# Patient Record
Sex: FEMALE | Race: WHITE | URBAN - METROPOLITAN AREA
[De-identification: names, ages, dates, MRNs, and addresses within clinical notes are randomized per-mention and may not be internally consistent; named-entity substitution may affect disease eponyms.]

---

## 2023-09-14 ENCOUNTER — HOSPITAL ENCOUNTER (OUTPATIENT)
Dept: DATA CONVERSION | Facility: HOSPITAL | Age: 43
Discharge: HOME | End: 2023-09-14
Payer: COMMERCIAL

## 2023-09-14 DIAGNOSIS — Z00.00 ENCOUNTER FOR GENERAL ADULT MEDICAL EXAMINATION WITHOUT ABNORMAL FINDINGS: ICD-10-CM

## 2023-09-14 DIAGNOSIS — E53.8 DEFICIENCY OF OTHER SPECIFIED B GROUP VITAMINS: ICD-10-CM

## 2023-09-14 DIAGNOSIS — Z13.6 ENCOUNTER FOR SCREENING FOR CARDIOVASCULAR DISORDERS: ICD-10-CM

## 2023-09-14 DIAGNOSIS — R53.83 OTHER FATIGUE: ICD-10-CM

## 2023-09-14 DIAGNOSIS — R10.9 UNSPECIFIED ABDOMINAL PAIN: ICD-10-CM

## 2023-09-14 DIAGNOSIS — E55.9 VITAMIN D DEFICIENCY, UNSPECIFIED: ICD-10-CM

## 2023-09-14 LAB
25(OH)D3 SERPL-MCNC: 61 NG/ML (ref 31–100)
ALBUMIN SERPL-MCNC: 3.9 GM/DL (ref 3.5–5)
ALBUMIN/GLOB SERPL: 1.1 RATIO (ref 1.5–3)
ALP BLD-CCNC: 113 U/L (ref 35–125)
ALT SERPL-CCNC: 31 U/L (ref 5–40)
AMYLASE SERPL-CCNC: 29 U/L (ref 28–100)
ANION GAP SERPL CALCULATED.3IONS-SCNC: 12 MMOL/L (ref 0–19)
APPEARANCE PLAS: CLEAR
AST SERPL-CCNC: 31 U/L (ref 5–40)
BACTERIA SPEC CULT: NORMAL
BACTERIA UR QL AUTO: PRESENT
BASOPHILS # BLD AUTO: 0.14 K/UL (ref 0–0.22)
BASOPHILS NFR BLD AUTO: 2.5 % (ref 0–1)
BILIRUB SERPL-MCNC: 0.2 MG/DL (ref 0.1–1.2)
BILIRUB UR QL STRIP.AUTO: NEGATIVE
BUN SERPL-MCNC: 8 MG/DL (ref 8–25)
BUN/CREAT SERPL: 8.9 RATIO (ref 8–21)
CALCIUM SERPL-MCNC: 9.2 MG/DL (ref 8.5–10.4)
CHLORIDE SERPL-SCNC: 105 MMOL/L (ref 97–107)
CHOLEST SERPL-MCNC: 188 MG/DL (ref 133–200)
CHOLEST/HDLC SERPL: 3.4 RATIO
CLARITY UR: ABNORMAL
CO2 SERPL-SCNC: 23 MMOL/L (ref 24–31)
COLOR SPUN FLD: YELLOW
COLOR UR: YELLOW
CREAT SERPL-MCNC: 0.9 MG/DL (ref 0.4–1.6)
DEPRECATED RDW RBC AUTO: 42.6 FL (ref 37–54)
DIFFERENTIAL METHOD BLD: ABNORMAL
EOSINOPHIL # BLD AUTO: 0.13 K/UL (ref 0–0.45)
EOSINOPHIL NFR BLD: 2.3 % (ref 0–3)
EPITH CASTS #/AREA UR COMP ASSIST: ABNORMAL /HPF
ERYTHROCYTE [DISTWIDTH] IN BLOOD BY AUTOMATED COUNT: 14.8 % (ref 11.7–15)
ESTRADIOL SERPL-MCNC: NORMAL PG/ML
FASTING STATUS PATIENT QL REPORTED: NORMAL
FSH SERPL-ACNC: 7.4 MU/ML
GFR SERPL CREATININE-BSD FRML MDRD: 82 ML/MIN/1.73 M2
GLOBULIN SER-MCNC: 3.6 G/DL (ref 1.9–3.7)
GLUCOSE SERPL-MCNC: 88 MG/DL (ref 65–99)
GLUCOSE UR STRIP.AUTO-MCNC: NEGATIVE MG/DL
HCT VFR BLD AUTO: 38.8 % (ref 36–44)
HDLC SERPL-MCNC: 56 MG/DL
HGB BLD-MCNC: 12.5 GM/DL (ref 12–15)
HGB UR QL STRIP.AUTO: ABNORMAL /HPF (ref 0–3)
HGB UR QL: NEGATIVE
IMM GRANULOCYTES # BLD AUTO: 0.02 K/UL (ref 0–0.1)
KETONES UR QL STRIP.AUTO: NEGATIVE
LDLC SERPL CALC-MCNC: 106 MG/DL (ref 65–130)
LEUKOCYTE ESTERASE UR QL STRIP.AUTO: ABNORMAL
LH SERPL-ACNC: 10.1 MU/ML
LIPASE SERPL-CCNC: 25 U/L (ref 16–63)
LYMPHOCYTES # BLD AUTO: 1.4 K/UL (ref 1.2–3.2)
LYMPHOCYTES NFR BLD MANUAL: 25 % (ref 20–40)
MCH RBC QN AUTO: 25.6 PG (ref 26–34)
MCHC RBC AUTO-ENTMCNC: 32.2 % (ref 31–37)
MCV RBC AUTO: 79.5 FL (ref 80–100)
MICRO URNS: ABNORMAL
MICROSCOPIC (UA): ABNORMAL
MONOCYTES # BLD AUTO: 0.66 K/UL (ref 0–0.8)
MONOCYTES NFR BLD MANUAL: 11.8 % (ref 0–8)
NEUTROPHILS # BLD AUTO: 3.25 K/UL
NEUTROPHILS # BLD AUTO: 3.25 K/UL (ref 1.8–7.7)
NEUTROPHILS.IMMATURE NFR BLD: 0.4 % (ref 0–1)
NEUTS SEG NFR BLD: 58 % (ref 50–70)
NITRITE UR QL STRIP.AUTO: NEGATIVE
NRBC BLD-RTO: 0 /100 WBC
PH UR STRIP.AUTO: 8 [PH] (ref 4.6–8)
PLATELET # BLD AUTO: 376 K/UL (ref 150–450)
PMV BLD AUTO: 11.1 CU (ref 7–12.6)
POTASSIUM SERPL-SCNC: 4 MMOL/L (ref 3.4–5.1)
PROGEST SERPL-MCNC: NORMAL NG/ML
PROLACTIN SERPL-MCNC: 8.7 NG/ML (ref 4.8–23.3)
PROT SERPL-MCNC: 7.5 G/DL (ref 5.9–7.9)
PROT UR STRIP.AUTO-MCNC: 30 MG/DL
RBC # BLD AUTO: 4.88 M/UL (ref 4–4.9)
REPORT STATUS -LH SQ DATA CONVERSION: NORMAL
SERVICE CMNT-IMP: NORMAL
SODIUM SERPL-SCNC: 140 MMOL/L (ref 133–145)
SP GR UR STRIP.AUTO: 1.02 (ref 1–1.03)
SPECIMEN SOURCE: NORMAL
TRIGL SERPL-MCNC: 129 MG/DL (ref 40–150)
TSH SERPL DL<=0.05 MIU/L-ACNC: 2.16 MIU/L (ref 0.27–4.2)
URINE CULTURE: ABNORMAL
UROBILINOGEN UR QL STRIP.AUTO: NORMAL MG/DL (ref 0–1)
VIT B12 SERPL-MCNC: 1371 PG/ML (ref 211–946)
WBC # BLD AUTO: 5.6 K/UL (ref 4.5–11)
WBC #/AREA URNS AUTO: ABNORMAL /HPF (ref 0–3)

## 2023-10-19 ENCOUNTER — ANCILLARY PROCEDURE (OUTPATIENT)
Dept: RADIOLOGY | Facility: CLINIC | Age: 43
End: 2023-10-19
Payer: COMMERCIAL

## 2023-10-19 DIAGNOSIS — C85.89 OTHER SPECIFIED TYPES OF NON-HODGKIN LYMPHOMA, EXTRANODAL AND SOLID ORGAN SITES (MULTI): Primary | ICD-10-CM

## 2023-10-19 PROCEDURE — 3430000001 HC RX 343 DIAGNOSTIC RADIOPHARMACEUTICALS: Performed by: INTERNAL MEDICINE

## 2023-10-19 PROCEDURE — 78815 PET IMAGE W/CT SKULL-THIGH: CPT | Mod: MG

## 2023-10-19 PROCEDURE — A9552 F18 FDG: HCPCS | Performed by: INTERNAL MEDICINE

## 2023-10-19 RX ORDER — FLUDEOXYGLUCOSE F 18 200 MCI/ML
11.9 INJECTION, SOLUTION INTRAVENOUS
Status: COMPLETED | OUTPATIENT
Start: 2023-10-19 | End: 2023-10-19

## 2023-10-19 RX ADMIN — FLUDEOXYGLUCOSE F 18 11.9 MILLICURIE: 200 INJECTION, SOLUTION INTRAVENOUS at 13:38

## 2023-11-01 ENCOUNTER — TELEPHONE (OUTPATIENT)
Dept: HEMATOLOGY/ONCOLOGY | Facility: CLINIC | Age: 43
End: 2023-11-01
Payer: COMMERCIAL

## 2023-11-01 NOTE — TELEPHONE ENCOUNTER
IMPRESSION:  No abnormal hypermetabolic foci to suggest lymphoma or other  malignancy.    Read this to Maciej who was very pleased.  No further needs.

## 2023-11-17 DIAGNOSIS — Z12.31 ENCOUNTER FOR SCREENING MAMMOGRAM FOR MALIGNANT NEOPLASM OF BREAST: ICD-10-CM

## 2023-11-17 DIAGNOSIS — K21.9 GASTROESOPHAGEAL REFLUX DISEASE WITHOUT ESOPHAGITIS: Primary | ICD-10-CM

## 2023-11-17 RX ORDER — OMEPRAZOLE 40 MG/1
40 CAPSULE, DELAYED RELEASE ORAL
Qty: 90 CAPSULE | Refills: 3 | Status: SHIPPED | OUTPATIENT
Start: 2023-11-17 | End: 2024-11-16

## 2023-12-11 NOTE — PROGRESS NOTES
Patient ID: Maciej Noyola is a 43 y.o. female.    Subjective    HPI  43-year-old woman with underlying depression and morbid obesity who was noted by her partner to have a lump in the lumbar spine region in early 2019. She thought  initially that this was a muscle issue, thus she saw a chiropractor. This worsened, thus she underwent further evaluation. The initial ultrasound on February 6, 2019 showed a 4 x 2.5 cm lesion. Subsequent MRI spine showed a lobulated appearing cystic  focus measuring up to 3.6 cm. No spinal cord/vertebral body involvement. On February 28, patient underwent an excision of the lumbar mass. 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 Flow cytometry showed a monoclonal B-cell  population without CD5, CD10 or CD11 expression, consistent with B-cell lymphoma. 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 Patient underwent a PET/CT on 03/25/2019, which showed diffuse hypermetabolic marrow  activity involvingvertebral bodies throughout the spine and pelvic bone. There was moderate hypermetabolic activity involving thespleen with maximum SUV of 5.3. This was all thought to be due to acute mononucleosis. The patient underwent a repeat PET/CT  in June 2019, which was completely negative. She then subsequentlyunderwent radiation therapy by Dr. Crowe to the soft tissue of the back. She received 26 Gy total, completing onAugust 16, 2019. She has since remained on observation. The patient complained  of right breast pruritus in April 2020. MRI breast was ordered, which showed a 1.3cmoval mass at the 3 o'clock position. Biopsy on May 8, 2020 revealed a low-grade marginal B cell lymphoma. She thenunderwent a PET/CT on June 17, 2020. This showed moderate  activity with an SUV of 2.4 corresponding to an ill-definedmass 2.6 cm in the posterior lateral aspect at the 9 o'clock axis of the right breast. There was also modest activity inthe  uterus, although she was menstruating. There was asymmetric hypermetabolic  bone marrow activity as well, including in the thoracic and lumbar spine region, questioning low-grade lymphoma. She received four doses of rituximab in 2020.   She is going through premenopause.  Some hot flashes.  No fevers, no weight loss.    No bone aches or pains   8, PET/CT showed stable disease from the prior, with persistent lowactivity in the thoracic and lumbar spine region and in that single breast region.      Interval follow-up: No B symptoms of fever, drenching night sweats, weight loss.  No new skin lesions.  No breast lesions.     FAMILY HISTORY: Maternal aunt breast cancer in her 70s, maternal uncle with pancreatic cancer in his 50s.   mother  of leukemia, , age 74     SOCIAL HISTORY: No smoking. No alcohol.      PAST SURGICAL HISTORY: Left back excisional biopsy, right knee surgery, dental extraction     Objective    BSA: There is no height or weight on file to calculate BSA.  There were no vitals taken for this visit.   Review of Systems   Constitutional:  Positive for fatigue. Negative for diaphoresis, fever and unexpected weight change.   HENT:  Negative for trouble swallowing.    Eyes:  Negative for visual disturbance.   Respiratory:  Negative for shortness of breath.    Cardiovascular:  Negative for leg swelling.   Gastrointestinal:  Negative for blood in stool and nausea.   Endocrine: Negative for cold intolerance.   Genitourinary:  Negative for dysuria.   Musculoskeletal:  Negative for back pain.   Skin:  Negative for rash.   Allergic/Immunologic: Negative for environmental allergies.   Neurological:  Positive for headaches. Negative for light-headedness.   Hematological:  Negative for adenopathy.   Psychiatric/Behavioral:  Negative for sleep disturbance.        Physical Exam  Constitutional:       Appearance: She is obese.   HENT:      Head: Normocephalic and atraumatic.      Right Ear:  External ear normal.      Left Ear: External ear normal.      Mouth/Throat:      Mouth: Mucous membranes are moist.      Pharynx: Oropharynx is clear.   Eyes:      Extraocular Movements: Extraocular movements intact.      Pupils: Pupils are equal, round, and reactive to light.   Cardiovascular:      Rate and Rhythm: Normal rate and regular rhythm.   Pulmonary:      Effort: Pulmonary effort is normal.      Breath sounds: Normal breath sounds.   Abdominal:      General: Abdomen is flat.      Palpations: Abdomen is soft.      Tenderness: There is abdominal tenderness (LUQ tenderness. no mass palpable).   Musculoskeletal:         General: Normal range of motion.      Cervical back: Normal range of motion and neck supple.   Skin:     General: Skin is warm.   Neurological:      General: No focal deficit present.      Mental Status: She is alert and oriented to person, place, and time.   Psychiatric:         Mood and Affect: Mood normal.         Behavior: Behavior normal.       Performance Status:        Assessment/Plan   Summary: 43-year-old woman with underlying depression versus ADD, obesity and marginal zone lymphoma status post radiation therapy.        Marginal zone lymphoma, cutaneous[back]  She presented in 2019 with an enlarging lower back lesion. She ultimately underwent excision of this lesion in February 2019, which showed a low grade B-cell lymphoma favoring a daron marginal zone lymphoma. PET/CT in March 2019 showed diffuse activity  throughout the spinous processes and in the spleen which was thought related to an acute infectious mononucleosis.   Repeat PET/CT in June 2019 was negative. She underwent radiation therapy to the back region, completing in August 2019.   She underwent MRI breast imaging for abnormal breast pruritus in April 2020, which revealed a1.3cm mass in the region. Biopsy confirmed recurrent marginal zone lymphoma.   PET/CT in June 2020 showeduptake in that breast area, questionably in the  uterine region and in the bones. Ultrasound of the pelvic region was negative.   She underwent a four week course of Rituximab.   Repeat PET/CT by November 2020 and breast imagingwas stable.   1. Recurrent marginal zone B-cell lymphoma. We will continue on observation. We will plan to repeat a PET/CTin June 2021. The rationale was that the CT imaging was unable to detect disease progression previously, especially in the spine region. She is  otherwise on observation alone. If she progresses in the future, we canalways consider lenalidomide with rituximab.  PET scan, 10/19/2023, no abnormal hypermetabolic foci to suggest lymphoma  diffuse heterogenous uptake within the bone marrow. This  finding can be seen with activated bone marrow, which is often seen  in a patient on chemotherapy. There is no evidence of focal  hypermetabolic osseous lesions to suggest metastasis.  Plan:    - continue with visits every 6 months   - labs every visit: CBC, Comp, LDH   - SPEP  - continue with yearly or twice yearly mammogram in January  PET scan in November 20204       Saw Vik Rojo in January      2. Mild leukopenia, resolved.      3. Melena. No recurrence  She did have a GI colonoscopy which was unremarkable in the fall of 2020.      4. Migraines. Headaches back of head.  Recommend hands and knee     5. Depression versus ADD. She will continue Qelbree® (viloxazine)      6. Abnormal scalp lesions.   These have resolved.  No recurrence since switching to Dove shampoo.      7. Abnormal breast lesions. Repeat mammography was normal as of November 2020.  Repeat mammogram in January 2024                  Lopez Hawkins MD

## 2023-12-12 ENCOUNTER — OFFICE VISIT (OUTPATIENT)
Dept: HEMATOLOGY/ONCOLOGY | Facility: CLINIC | Age: 43
End: 2023-12-12
Payer: COMMERCIAL

## 2023-12-12 VITALS
HEART RATE: 75 BPM | OXYGEN SATURATION: 99 % | TEMPERATURE: 96.1 F | DIASTOLIC BLOOD PRESSURE: 86 MMHG | WEIGHT: 223.66 LBS | HEIGHT: 63 IN | BODY MASS INDEX: 39.63 KG/M2 | SYSTOLIC BLOOD PRESSURE: 127 MMHG | RESPIRATION RATE: 18 BRPM

## 2023-12-12 DIAGNOSIS — D50.0 IRON DEFICIENCY ANEMIA DUE TO CHRONIC BLOOD LOSS: Primary | ICD-10-CM

## 2023-12-12 PROBLEM — F98.8 ATTENTION DEFICIT DISORDER: Status: ACTIVE | Noted: 2023-12-12

## 2023-12-12 PROBLEM — M54.2 CERVICALGIA: Status: ACTIVE | Noted: 2023-12-12

## 2023-12-12 PROBLEM — M75.40 ROTATOR CUFF IMPINGEMENT SYNDROME: Status: ACTIVE | Noted: 2023-12-12

## 2023-12-12 PROBLEM — F32.9 MAJOR DEPRESSIVE DISORDER, SINGLE EPISODE, UNSPECIFIED: Status: ACTIVE | Noted: 2023-12-12

## 2023-12-12 PROBLEM — C85.10 LOW GRADE B-CELL LYMPHOMA (MULTI): Status: ACTIVE | Noted: 2023-12-12

## 2023-12-12 PROBLEM — R07.9 CHEST PAIN: Status: ACTIVE | Noted: 2023-12-12

## 2023-12-12 PROBLEM — M25.539 PAIN IN WRIST: Status: ACTIVE | Noted: 2023-12-12

## 2023-12-12 PROBLEM — E66.9 OBESITY (BMI 30-39.9): Status: ACTIVE | Noted: 2023-12-12

## 2023-12-12 PROBLEM — M54.30 SCIATICA: Status: ACTIVE | Noted: 2023-12-12

## 2023-12-12 PROBLEM — G47.19 EXCESSIVE DAYTIME SLEEPINESS: Status: ACTIVE | Noted: 2023-12-12

## 2023-12-12 PROBLEM — M79.641 PAIN IN RIGHT HAND: Status: ACTIVE | Noted: 2023-12-12

## 2023-12-12 PROBLEM — E55.9 VITAMIN D DEFICIENCY: Status: ACTIVE | Noted: 2023-12-12

## 2023-12-12 PROBLEM — E53.8 VITAMIN B12 DEFICIENCY: Status: ACTIVE | Noted: 2023-12-12

## 2023-12-12 PROBLEM — R41.840 ATTENTION AND CONCENTRATION DEFICIT: Status: ACTIVE | Noted: 2023-12-12

## 2023-12-12 PROBLEM — G47.19 OTHER HYPERSOMNIA: Status: ACTIVE | Noted: 2023-12-12

## 2023-12-12 PROBLEM — S42.293A CLOSED FRACTURE OF HEAD OF HUMERUS: Status: ACTIVE | Noted: 2023-12-12

## 2023-12-12 PROBLEM — M25.511 ACUTE PAIN OF RIGHT SHOULDER: Status: ACTIVE | Noted: 2023-12-12

## 2023-12-12 PROBLEM — M75.81 TENDINITIS OF RIGHT ROTATOR CUFF: Status: ACTIVE | Noted: 2023-12-12

## 2023-12-12 PROBLEM — R10.9 ABDOMINAL PAIN: Status: ACTIVE | Noted: 2023-12-12

## 2023-12-12 PROBLEM — S46.011A STRAIN OF RIGHT ROTATOR CUFF CAPSULE: Status: ACTIVE | Noted: 2023-12-12

## 2023-12-12 PROBLEM — G47.33 OSA (OBSTRUCTIVE SLEEP APNEA): Status: ACTIVE | Noted: 2023-12-12

## 2023-12-12 PROBLEM — M75.01 ADHESIVE CAPSULITIS OF RIGHT SHOULDER: Status: ACTIVE | Noted: 2023-12-12

## 2023-12-12 PROBLEM — R25.2 SPASM: Status: ACTIVE | Noted: 2023-12-12

## 2023-12-12 PROBLEM — F41.9 ANXIETY: Status: ACTIVE | Noted: 2023-12-12

## 2023-12-12 PROBLEM — M95.8 WINGED SCAPULA OF BOTH SIDES: Status: ACTIVE | Noted: 2023-12-12

## 2023-12-12 PROBLEM — M75.111 INCOMPLETE TEAR OF RIGHT ROTATOR CUFF: Status: ACTIVE | Noted: 2023-12-12

## 2023-12-12 LAB
ALBUMIN SERPL BCP-MCNC: 4.1 G/DL (ref 3.4–5)
ALP SERPL-CCNC: 98 U/L (ref 33–110)
ALT SERPL W P-5'-P-CCNC: 14 U/L (ref 7–45)
ANION GAP SERPL CALC-SCNC: 15 MMOL/L (ref 10–20)
AST SERPL W P-5'-P-CCNC: 16 U/L (ref 9–39)
BASOPHILS # BLD AUTO: 0.13 X10*3/UL (ref 0–0.1)
BASOPHILS NFR BLD AUTO: 2 %
BILIRUB SERPL-MCNC: 0.5 MG/DL (ref 0–1.2)
BUN SERPL-MCNC: 9 MG/DL (ref 6–23)
CALCIUM SERPL-MCNC: 8.6 MG/DL (ref 8.6–10.3)
CHLORIDE SERPL-SCNC: 104 MMOL/L (ref 98–107)
CO2 SERPL-SCNC: 25 MMOL/L (ref 21–32)
CREAT SERPL-MCNC: 0.86 MG/DL (ref 0.5–1.05)
EOSINOPHIL # BLD AUTO: 0.17 X10*3/UL (ref 0–0.7)
EOSINOPHIL NFR BLD AUTO: 2.6 %
ERYTHROCYTE [DISTWIDTH] IN BLOOD BY AUTOMATED COUNT: 14.7 % (ref 11.5–14.5)
FERRITIN SERPL-MCNC: 40 NG/ML (ref 8–150)
GFR SERPL CREATININE-BSD FRML MDRD: 87 ML/MIN/1.73M*2
GLUCOSE SERPL-MCNC: 86 MG/DL (ref 74–99)
HCT VFR BLD AUTO: 39.2 % (ref 36–46)
HGB BLD-MCNC: 12.4 G/DL (ref 12–16)
IMM GRANULOCYTES # BLD AUTO: 0.03 X10*3/UL (ref 0–0.7)
IMM GRANULOCYTES NFR BLD AUTO: 0.5 % (ref 0–0.9)
IRON SATN MFR SERPL: 15 % (ref 25–45)
IRON SERPL-MCNC: 56 UG/DL (ref 35–150)
LDH SERPL L TO P-CCNC: 132 U/L (ref 84–246)
LYMPHOCYTES # BLD AUTO: 1.39 X10*3/UL (ref 1.2–4.8)
LYMPHOCYTES NFR BLD AUTO: 21.3 %
MCH RBC QN AUTO: 25.3 PG (ref 26–34)
MCHC RBC AUTO-ENTMCNC: 31.6 G/DL (ref 32–36)
MCV RBC AUTO: 80 FL (ref 80–100)
MONOCYTES # BLD AUTO: 0.58 X10*3/UL (ref 0.1–1)
MONOCYTES NFR BLD AUTO: 8.9 %
NEUTROPHILS # BLD AUTO: 4.22 X10*3/UL (ref 1.2–7.7)
NEUTROPHILS NFR BLD AUTO: 64.7 %
NRBC BLD-RTO: 0 /100 WBCS (ref 0–0)
PLATELET # BLD AUTO: 401 X10*3/UL (ref 150–450)
POTASSIUM SERPL-SCNC: 3.5 MMOL/L (ref 3.5–5.3)
PROT SERPL-MCNC: 7.1 G/DL (ref 6.4–8.2)
RBC # BLD AUTO: 4.91 X10*6/UL (ref 4–5.2)
SODIUM SERPL-SCNC: 140 MMOL/L (ref 136–145)
TIBC SERPL-MCNC: 375 UG/DL (ref 240–445)
UIBC SERPL-MCNC: 319 UG/DL (ref 110–370)
WBC # BLD AUTO: 6.5 X10*3/UL (ref 4.4–11.3)

## 2023-12-12 PROCEDURE — 99214 OFFICE O/P EST MOD 30 MIN: CPT | Performed by: INTERNAL MEDICINE

## 2023-12-12 PROCEDURE — 83550 IRON BINDING TEST: CPT | Performed by: INTERNAL MEDICINE

## 2023-12-12 PROCEDURE — 36415 COLL VENOUS BLD VENIPUNCTURE: CPT | Performed by: INTERNAL MEDICINE

## 2023-12-12 PROCEDURE — 82728 ASSAY OF FERRITIN: CPT | Performed by: INTERNAL MEDICINE

## 2023-12-12 PROCEDURE — 80053 COMPREHEN METABOLIC PANEL: CPT | Performed by: INTERNAL MEDICINE

## 2023-12-12 PROCEDURE — 83615 LACTATE (LD) (LDH) ENZYME: CPT | Performed by: INTERNAL MEDICINE

## 2023-12-12 PROCEDURE — 85025 COMPLETE CBC W/AUTO DIFF WBC: CPT | Performed by: INTERNAL MEDICINE

## 2023-12-12 RX ORDER — LISDEXAMFETAMINE DIMESYLATE 30 MG/1
30 CAPSULE ORAL EVERY MORNING
COMMUNITY
Start: 2023-05-02 | End: 2024-03-06 | Stop reason: ALTCHOICE

## 2023-12-12 RX ORDER — UBIDECARENONE 75 MG
500 CAPSULE ORAL DAILY
COMMUNITY

## 2023-12-12 RX ORDER — SUCRALFATE 1 G/1
1 TABLET ORAL
COMMUNITY
Start: 2023-05-22 | End: 2024-03-06 | Stop reason: ALTCHOICE

## 2023-12-12 RX ORDER — CYCLOBENZAPRINE HCL 5 MG
5 TABLET ORAL EVERY 12 HOURS PRN
COMMUNITY
Start: 2021-10-29 | End: 2024-03-06 | Stop reason: ALTCHOICE

## 2023-12-12 RX ORDER — VILOXAZINE HYDROCHLORIDE 100 MG/1
2 CAPSULE, EXTENDED RELEASE ORAL DAILY
COMMUNITY

## 2023-12-12 RX ORDER — CLONIDINE HYDROCHLORIDE 0.1 MG/1
0.1 TABLET ORAL NIGHTLY
COMMUNITY
Start: 2023-06-13 | End: 2024-03-06 | Stop reason: ALTCHOICE

## 2023-12-12 RX ORDER — DEXLANSOPRAZOLE 60 MG/1
1 CAPSULE, DELAYED RELEASE ORAL DAILY
COMMUNITY
Start: 2023-05-22 | End: 2024-03-06 | Stop reason: ALTCHOICE

## 2023-12-12 RX ORDER — VILAZODONE HYDROCHLORIDE 20 MG/1
20 TABLET ORAL
COMMUNITY
Start: 2017-11-06 | End: 2024-03-06 | Stop reason: ALTCHOICE

## 2023-12-12 RX ORDER — VILAZODONE HYDROCHLORIDE 10 MG/1
10 TABLET ORAL
COMMUNITY
Start: 2021-06-28 | End: 2024-03-06 | Stop reason: ALTCHOICE

## 2023-12-12 RX ORDER — ERGOCALCIFEROL 1.25 MG/1
1 CAPSULE ORAL
COMMUNITY
Start: 2021-08-27 | End: 2024-06-03

## 2023-12-12 ASSESSMENT — ENCOUNTER SYMPTOMS
LOSS OF SENSATION IN FEET: 0
BACK PAIN: 0
NAUSEA: 0
FATIGUE: 1
LIGHT-HEADEDNESS: 0
HEADACHES: 1
DEPRESSION: 0
TROUBLE SWALLOWING: 0
UNEXPECTED WEIGHT CHANGE: 0
DIAPHORESIS: 0
FEVER: 0
DYSURIA: 0
SLEEP DISTURBANCE: 0
BLOOD IN STOOL: 0
OCCASIONAL FEELINGS OF UNSTEADINESS: 0
ADENOPATHY: 0
SHORTNESS OF BREATH: 0

## 2023-12-12 ASSESSMENT — COLUMBIA-SUICIDE SEVERITY RATING SCALE - C-SSRS
1. IN THE PAST MONTH, HAVE YOU WISHED YOU WERE DEAD OR WISHED YOU COULD GO TO SLEEP AND NOT WAKE UP?: NO
2. HAVE YOU ACTUALLY HAD ANY THOUGHTS OF KILLING YOURSELF?: NO
6. HAVE YOU EVER DONE ANYTHING, STARTED TO DO ANYTHING, OR PREPARED TO DO ANYTHING TO END YOUR LIFE?: NO

## 2023-12-12 ASSESSMENT — PATIENT HEALTH QUESTIONNAIRE - PHQ9
2. FEELING DOWN, DEPRESSED OR HOPELESS: NOT AT ALL
1. LITTLE INTEREST OR PLEASURE IN DOING THINGS: NOT AT ALL
SUM OF ALL RESPONSES TO PHQ9 QUESTIONS 1 AND 2: 0

## 2023-12-12 ASSESSMENT — PAIN SCALES - GENERAL: PAINLEVEL: 0-NO PAIN

## 2023-12-12 NOTE — PROGRESS NOTES
Patient here alone for follow up with Dr. Hawkins for review of scan.    Reconciled and reviewed medication and allergy list with patient.     Labs drawn today, prior to MD visit, per MD orders.    Patient states she is having no issues/concerns at this time.    Per MD, patient to return in 6 months.    No barriers to education noted, pt. Agreed to plan and verbalized understanding using teach back method

## 2024-01-02 ENCOUNTER — ANCILLARY PROCEDURE (OUTPATIENT)
Dept: RADIOLOGY | Facility: CLINIC | Age: 44
End: 2024-01-02
Payer: COMMERCIAL

## 2024-01-02 VITALS — HEIGHT: 64 IN | BODY MASS INDEX: 38.41 KG/M2 | WEIGHT: 225 LBS

## 2024-01-02 DIAGNOSIS — Z12.31 ENCOUNTER FOR SCREENING MAMMOGRAM FOR MALIGNANT NEOPLASM OF BREAST: ICD-10-CM

## 2024-01-02 PROCEDURE — 77067 SCR MAMMO BI INCL CAD: CPT

## 2024-02-02 ENCOUNTER — TELEPHONE (OUTPATIENT)
Dept: HEMATOLOGY/ONCOLOGY | Facility: CLINIC | Age: 44
End: 2024-02-02
Payer: COMMERCIAL

## 2024-02-06 ENCOUNTER — OFFICE VISIT (OUTPATIENT)
Dept: HEMATOLOGY/ONCOLOGY | Facility: CLINIC | Age: 44
End: 2024-02-06
Payer: COMMERCIAL

## 2024-02-06 VITALS
TEMPERATURE: 96.1 F | OXYGEN SATURATION: 93 % | DIASTOLIC BLOOD PRESSURE: 83 MMHG | HEART RATE: 109 BPM | SYSTOLIC BLOOD PRESSURE: 130 MMHG | BODY MASS INDEX: 38.18 KG/M2 | RESPIRATION RATE: 18 BRPM | WEIGHT: 222.44 LBS

## 2024-02-06 DIAGNOSIS — C85.10 LOW GRADE B-CELL LYMPHOMA (MULTI): Primary | ICD-10-CM

## 2024-02-06 LAB
ALBUMIN SERPL BCP-MCNC: 4.4 G/DL (ref 3.4–5)
ALP SERPL-CCNC: 103 U/L (ref 33–110)
ALT SERPL W P-5'-P-CCNC: 25 U/L (ref 7–45)
ANION GAP SERPL CALC-SCNC: 13 MMOL/L (ref 10–20)
AST SERPL W P-5'-P-CCNC: 28 U/L (ref 9–39)
BASOPHILS # BLD AUTO: 0.16 X10*3/UL (ref 0–0.1)
BASOPHILS NFR BLD AUTO: 1.9 %
BILIRUB SERPL-MCNC: 0.3 MG/DL (ref 0–1.2)
BUN SERPL-MCNC: 9 MG/DL (ref 6–23)
CALCIUM SERPL-MCNC: 9.1 MG/DL (ref 8.6–10.3)
CHLORIDE SERPL-SCNC: 103 MMOL/L (ref 98–107)
CO2 SERPL-SCNC: 27 MMOL/L (ref 21–32)
CREAT SERPL-MCNC: 0.92 MG/DL (ref 0.5–1.05)
EGFRCR SERPLBLD CKD-EPI 2021: 80 ML/MIN/1.73M*2
EOSINOPHIL # BLD AUTO: 0.22 X10*3/UL (ref 0–0.7)
EOSINOPHIL NFR BLD AUTO: 2.7 %
ERYTHROCYTE [DISTWIDTH] IN BLOOD BY AUTOMATED COUNT: 14.6 % (ref 11.5–14.5)
GLUCOSE SERPL-MCNC: 93 MG/DL (ref 74–99)
HCT VFR BLD AUTO: 40.8 % (ref 36–46)
HGB BLD-MCNC: 12.9 G/DL (ref 12–16)
IGE SERPL-ACNC: <2 IU/ML (ref 0–214)
IMM GRANULOCYTES # BLD AUTO: 0.03 X10*3/UL (ref 0–0.7)
IMM GRANULOCYTES NFR BLD AUTO: 0.4 % (ref 0–0.9)
LYMPHOCYTES # BLD AUTO: 1.78 X10*3/UL (ref 1.2–4.8)
LYMPHOCYTES NFR BLD AUTO: 21.6 %
MCH RBC QN AUTO: 25.2 PG (ref 26–34)
MCHC RBC AUTO-ENTMCNC: 31.6 G/DL (ref 32–36)
MCV RBC AUTO: 80 FL (ref 80–100)
MONOCYTES # BLD AUTO: 0.83 X10*3/UL (ref 0.1–1)
MONOCYTES NFR BLD AUTO: 10.1 %
NEUTROPHILS # BLD AUTO: 5.22 X10*3/UL (ref 1.2–7.7)
NEUTROPHILS NFR BLD AUTO: 63.3 %
NRBC BLD-RTO: 0 /100 WBCS (ref 0–0)
PLATELET # BLD AUTO: 450 X10*3/UL (ref 150–450)
POTASSIUM SERPL-SCNC: 4.3 MMOL/L (ref 3.5–5.3)
PROT SERPL-MCNC: 7.7 G/DL (ref 6.4–8.2)
RBC # BLD AUTO: 5.12 X10*6/UL (ref 4–5.2)
SODIUM SERPL-SCNC: 139 MMOL/L (ref 136–145)
TSH SERPL-ACNC: 2.07 MIU/L (ref 0.44–3.98)
WBC # BLD AUTO: 8.2 X10*3/UL (ref 4.4–11.3)

## 2024-02-06 PROCEDURE — 99214 OFFICE O/P EST MOD 30 MIN: CPT | Mod: 25,27 | Performed by: INTERNAL MEDICINE

## 2024-02-06 PROCEDURE — 1036F TOBACCO NON-USER: CPT | Performed by: INTERNAL MEDICINE

## 2024-02-06 PROCEDURE — 99214 OFFICE O/P EST MOD 30 MIN: CPT | Performed by: INTERNAL MEDICINE

## 2024-02-06 PROCEDURE — 82728 ASSAY OF FERRITIN: CPT | Performed by: INTERNAL MEDICINE

## 2024-02-06 PROCEDURE — 83540 ASSAY OF IRON: CPT | Performed by: INTERNAL MEDICINE

## 2024-02-06 PROCEDURE — 84443 ASSAY THYROID STIM HORMONE: CPT | Performed by: INTERNAL MEDICINE

## 2024-02-06 PROCEDURE — 83516 IMMUNOASSAY NONANTIBODY: CPT | Performed by: INTERNAL MEDICINE

## 2024-02-06 PROCEDURE — G0452 MOLECULAR PATHOLOGY INTERPR: HCPCS | Performed by: INTERNAL MEDICINE

## 2024-02-06 PROCEDURE — 81270 JAK2 GENE: CPT | Performed by: INTERNAL MEDICINE

## 2024-02-06 PROCEDURE — 83615 LACTATE (LD) (LDH) ENZYME: CPT | Performed by: INTERNAL MEDICINE

## 2024-02-06 PROCEDURE — 83520 IMMUNOASSAY QUANT NOS NONAB: CPT | Performed by: INTERNAL MEDICINE

## 2024-02-06 PROCEDURE — 84075 ASSAY ALKALINE PHOSPHATASE: CPT | Performed by: INTERNAL MEDICINE

## 2024-02-06 PROCEDURE — 82977 ASSAY OF GGT: CPT | Performed by: INTERNAL MEDICINE

## 2024-02-06 PROCEDURE — 82785 ASSAY OF IGE: CPT | Performed by: INTERNAL MEDICINE

## 2024-02-06 PROCEDURE — 85025 COMPLETE CBC W/AUTO DIFF WBC: CPT | Performed by: INTERNAL MEDICINE

## 2024-02-06 RX ORDER — HYDROXYZINE HYDROCHLORIDE 10 MG/1
25 TABLET, FILM COATED ORAL EVERY 6 HOURS PRN
Qty: 30 TABLET | Refills: 2 | Status: SHIPPED | OUTPATIENT
Start: 2024-02-06 | End: 2024-02-16

## 2024-02-06 ASSESSMENT — ENCOUNTER SYMPTOMS
DEPRESSION: 0
SLEEP DISTURBANCE: 0
RHINORRHEA: 0
DIARRHEA: 0
CONSTIPATION: 0
FATIGUE: 1
ADENOPATHY: 0
HEADACHES: 1
SHORTNESS OF BREATH: 0
FEVER: 0
OCCASIONAL FEELINGS OF UNSTEADINESS: 0
HEMATURIA: 0
UNEXPECTED WEIGHT CHANGE: 0
EYE ITCHING: 1
LOSS OF SENSATION IN FEET: 0
BACK PAIN: 0
CHILLS: 0

## 2024-02-06 ASSESSMENT — PATIENT HEALTH QUESTIONNAIRE - PHQ9
SUM OF ALL RESPONSES TO PHQ9 QUESTIONS 1 AND 2: 0
2. FEELING DOWN, DEPRESSED OR HOPELESS: NOT AT ALL
1. LITTLE INTEREST OR PLEASURE IN DOING THINGS: NOT AT ALL

## 2024-02-06 ASSESSMENT — COLUMBIA-SUICIDE SEVERITY RATING SCALE - C-SSRS
6. HAVE YOU EVER DONE ANYTHING, STARTED TO DO ANYTHING, OR PREPARED TO DO ANYTHING TO END YOUR LIFE?: NO
1. IN THE PAST MONTH, HAVE YOU WISHED YOU WERE DEAD OR WISHED YOU COULD GO TO SLEEP AND NOT WAKE UP?: NO
2. HAVE YOU ACTUALLY HAD ANY THOUGHTS OF KILLING YOURSELF?: NO

## 2024-02-06 ASSESSMENT — PAIN SCALES - GENERAL: PAINLEVEL: 0-NO PAIN

## 2024-02-06 NOTE — PROGRESS NOTES
Patient here alone for same day visit with Dr. Hawkins as patient is having severe itching without rashes.    Reconciled and reviewed medication and allergy list with patient.    Per MD, patient to have labs drawn today and return in 3 weeks to discuss results.    Reviewed AVS with patient.      No barriers to education noted, pt. Agreed to plan and verbalized understanding using teach back method

## 2024-02-06 NOTE — PROGRESS NOTES
Patient ID: Maciej Noyola is a 43 y.o. female.    Subjective    HPI  Awoke after [place] with back itchy, spread to stomach, armpits  She has tried lotions, aveeno, aquaphor, cortisone.  No rash.        43-year-old woman with underlying depression and morbid obesity who was noted by her partner to have a lump in the lumbar spine region in early 2019. She thought  initially that this was a muscle issue, thus she saw a chiropractor. This worsened, thus she underwent further evaluation. The initial ultrasound on February 6, 2019 showed a 4 x 2.5 cm lesion. Subsequent MRI spine showed a lobulated appearing cystic  focus measuring up to 3.6 cm. No spinal cord/vertebral body involvement. On February 28, patient underwent an excision of the lumbar mass. 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 Flow cytometry showed a monoclonal B-cell  population without CD5, CD10 or CD11 expression, consistent with B-cell lymphoma. 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 Patient underwent a PET/CT on 03/25/2019, which showed diffuse hypermetabolic marrow  activity involvingvertebral bodies throughout the spine and pelvic bone. There was moderate hypermetabolic activity involving thespleen with maximum SUV of 5.3. This was all thought to be due to acute mononucleosis. The patient underwent a repeat PET/CT  in June 2019, which was completely negative. She then subsequentlyunderwent radiation therapy by Dr. Crowe to the soft tissue of the back. She received 26 Gy total, completing onAugust 16, 2019. She has since remained on observation. The patient complained  of right breast pruritus in April 2020. MRI breast was ordered, which showed a 1.3cmoval mass at the 3 o'clock position. Biopsy on May 8, 2020 revealed a low-grade marginal B cell lymphoma. She thenunderwent a PET/CT on June 17, 2020. This showed moderate  activity with an SUV of 2.4 corresponding to an  ill-definedmass 2.6 cm in the posterior lateral aspect at the 9 o'clock axis of the right breast. There was also modest activity inthe uterus, although she was menstruating. There was asymmetric hypermetabolic  bone marrow activity as well, including in the thoracic and lumbar spine region, questioning low-grade lymphoma. She received four doses of rituximab in 2020.   She is going through premenopause.  Some hot flashes.  No fevers, no weight loss.    No bone aches or pains   22, PET/CT showed stable disease from the prior, with persistent lowactivity in the thoracic and lumbar spine region and in that single breast region.       Interval follow-up: No B symptoms of fever, drenching night sweats, weight loss.  No new skin lesions.  No breast lesions.     FAMILY HISTORY: Maternal aunt breast cancer in her 70s, maternal uncle with pancreatic cancer in his 50s.   mother  of leukemia, , age 74     SOCIAL HISTORY: No smoking. No alcohol.      PAST SURGICAL HISTORY: Left back excisional biopsy, right knee surgery, dental extraction     Objective    BSA: 2.14 meters squared  /83 (BP Location: Left arm, Patient Position: Sitting, BP Cuff Size: Adult long)   Pulse 109   Temp 35.6 °C (96.1 °F) (Temporal)   Resp 18   Wt 101 kg (222 lb 7.1 oz)   SpO2 93%   BMI 38.18 kg/m²    Review of Systems   Constitutional:  Positive for fatigue. Negative for chills, fever and unexpected weight change.   HENT:  Negative for rhinorrhea.    Eyes:  Positive for itching.   Respiratory:  Negative for shortness of breath.    Cardiovascular:  Negative for leg swelling.   Gastrointestinal:  Negative for constipation and diarrhea.   Endocrine: Negative for cold intolerance.   Genitourinary:  Negative for hematuria.   Musculoskeletal:  Negative for back pain.   Skin:  Negative for rash.   Allergic/Immunologic: Negative for environmental allergies.   Neurological:  Positive for headaches.   Hematological:  Negative  for adenopathy.   Psychiatric/Behavioral:  Negative for sleep disturbance.        Physical Exam  Constitutional:       Appearance: Normal appearance.   HENT:      Head: Normocephalic and atraumatic.      Right Ear: External ear normal.      Left Ear: External ear normal.      Nose: Nose normal.      Mouth/Throat:      Mouth: Mucous membranes are moist.      Pharynx: Oropharynx is clear.   Eyes:      Extraocular Movements: Extraocular movements intact.      Pupils: Pupils are equal, round, and reactive to light.   Cardiovascular:      Rate and Rhythm: Normal rate and regular rhythm.   Pulmonary:      Effort: No respiratory distress.   Abdominal:      General: Abdomen is flat. Bowel sounds are normal.   Musculoskeletal:         General: Normal range of motion.      Cervical back: Normal range of motion.   Skin:     General: Skin is warm and dry.   Neurological:      General: No focal deficit present.      Mental Status: She is alert and oriented to person, place, and time.   Psychiatric:         Mood and Affect: Mood normal.         Behavior: Behavior normal.             Assessment/Plan     Summary: 43-year-old woman with underlying depression versus ADD, obesity and marginal zone lymphoma status post radiation therapy.        Marginal zone lymphoma, cutaneous[back]  She presented in 2019 with an enlarging lower back lesion. She ultimately underwent excision of this lesion in February 2019, which showed a low grade B-cell lymphoma favoring a daron marginal zone lymphoma. PET/CT in March 2019 showed diffuse activity  throughout the spinous processes and in the spleen which was thought related to an acute infectious mononucleosis.   Repeat PET/CT in June 2019 was negative. She underwent radiation therapy to the back region, completing in August 2019.   She underwent MRI breast imaging for abnormal breast pruritus in April 2020, which revealed a1.3cm mass in the region. Biopsy confirmed recurrent marginal zone  lymphoma.   PET/CT in June 2020 showeduptake in that breast area, questionably in the uterine region and in the bones. Ultrasound of the pelvic region was negative.   She underwent a four week course of Rituximab.   Repeat PET/CT by November 2020 and breast imagingwas stable.   1. Recurrent marginal zone B-cell lymphoma. We will continue on observation. We will plan to repeat a PET/CTin June 2021. The rationale was that the CT imaging was unable to detect disease progression previously, especially in the spine region. She is  otherwise on observation alone. If she progresses in the future, we canalways consider lenalidomide with rituximab.  PET scan, 10/19/2023, no abnormal hypermetabolic foci to suggest lymphoma  diffuse heterogenous uptake within the bone marrow. This  finding can be seen with activated bone marrow, which is often seen  in a patient on chemotherapy. There is no evidence of focal  hypermetabolic osseous lesions to suggest metastasis.  Plan:    - continue with visits every 6 months   - labs every visit: CBC, Comp, LDH   - SPEP  - continue with yearly or twice yearly mammogram in January  PET scan in November 2024        Saw Vik Rojo in January      2. Mild leukopenia, resolved.      3. Melena. No recurrence  She did have a GI colonoscopy which was unremarkable in the fall of 2020.      4. Migraines. Headaches back of head.  Recommend hands and knee     5. Depression versus ADD. She will continue Qelbree® (viloxazine)      6. Abnormal scalp lesions.   These have resolved.  No recurrence since switching to Dove shampoo.       7. Abnormal breast lesions. Repeat mammography was normal as of November 2020.  Repeat mammogram in January 2024       Group II - pruritus on nondiseased (noninflamed skin), such as pruritus related to end-stage kidney disease, cholestatic pruritus, or neuropathic pruritus  Onset, December 2023  Systemic - Systemic causes of pruritus consist of disorders that affect other  "organ systems, such as chronic renal failure, liver disease, hematologic or lymphoproliferative disorders, and malignancy. Drug-induced pruritus is also included in this category.[No new medications]  Endocrine disease -- Pruritus may occur in association with thyroid disease and diabetes:  ?Thyroid disorders - Generalized pruritus is seen commonly in patients with thyrotoxicosis, particularly in Graves' disease [46,49]. Possible mechanisms include reduction of the itch threshold mediated by vasodilation, as well as activation of kinin pathways secondary to an increased metabolic rate. Hypothyroidism is less frequently associated with itch and is associated with skin xerosis [46]. (See \"Overview of the clinical manifestations of hyperthyroidism in adults\" and \"Clinical manifestations of hypothyroidism\".)  ?Diabetes   PV  Carcinoid  Systemic rheumatic disease -- Itch is a common symptom of dermatomyositis, systemic sclerosis (scleroderma), and primary Sjögren's syndrome (P-SS) [63-69]. The pathophysiology of chronic itch in systemic rheumatic diseases is not well understood; however, P-SS is highly associated with skin xerosis. [No rheumatological condition]  ?Complete blood count with differential to evaluate for evidence of malignancy, myeloproliferative disease, or iron deficiency  ?Serum bilirubin, transaminases, and alkaline phosphatase to evaluate for evidence of liver disease  ?Thyroid-stimulating hormone to evaluate for evidence of a thyroid disorder  ?Blood urea nitrogen and creatinine to evaluate for renal disease  ?Chest radiograph  No wheezing, no diarrhea   PLAN: CMP, TSH, 5HIAA, JAK2, IgE, Flow, tryptase, LDH, GGT, celiac, iron panel, ferritin  Then return to clinic in 1 month to review the results    Lopez Hawkins MD          "

## 2024-02-07 DIAGNOSIS — D50.0 IRON DEFICIENCY ANEMIA DUE TO CHRONIC BLOOD LOSS: Primary | ICD-10-CM

## 2024-02-07 LAB
FERRITIN SERPL-MCNC: 41 NG/ML (ref 8–150)
GGT SERPL-CCNC: 42 U/L (ref 5–55)
GLIADIN PEPTIDE IGA SER IA-ACNC: <1 U/ML
IRON SATN MFR SERPL: 9 % (ref 25–45)
IRON SERPL-MCNC: 35 UG/DL (ref 35–150)
LDH SERPL L TO P-CCNC: 136 U/L (ref 84–246)
TIBC SERPL-MCNC: 411 UG/DL (ref 240–445)
TTG IGA SER IA-ACNC: <1 U/ML
UIBC SERPL-MCNC: 376 UG/DL (ref 110–370)

## 2024-02-07 RX ORDER — EPINEPHRINE 0.3 MG/.3ML
0.3 INJECTION SUBCUTANEOUS EVERY 5 MIN PRN
Status: CANCELLED | OUTPATIENT
Start: 2024-02-14

## 2024-02-07 RX ORDER — DIPHENHYDRAMINE HYDROCHLORIDE 50 MG/ML
50 INJECTION INTRAMUSCULAR; INTRAVENOUS AS NEEDED
Status: CANCELLED | OUTPATIENT
Start: 2024-02-14

## 2024-02-07 RX ORDER — HEPARIN 100 UNIT/ML
500 SYRINGE INTRAVENOUS AS NEEDED
Status: CANCELLED | OUTPATIENT
Start: 2024-02-07

## 2024-02-07 RX ORDER — FAMOTIDINE 10 MG/ML
20 INJECTION INTRAVENOUS ONCE AS NEEDED
Status: CANCELLED | OUTPATIENT
Start: 2024-02-14

## 2024-02-07 RX ORDER — ALBUTEROL SULFATE 0.83 MG/ML
3 SOLUTION RESPIRATORY (INHALATION) AS NEEDED
Status: CANCELLED | OUTPATIENT
Start: 2024-02-14

## 2024-02-07 RX ORDER — HEPARIN SODIUM,PORCINE/PF 10 UNIT/ML
50 SYRINGE (ML) INTRAVENOUS AS NEEDED
Status: CANCELLED | OUTPATIENT
Start: 2024-02-07

## 2024-02-09 LAB
GLIADIN PEPTIDE IGG SER IA-ACNC: <0.56 FLU (ref 0–4.99)
TRYPTASE SERPL-MCNC: 5.5 UG/L
TTG IGG SER IA-ACNC: <0.82 FLU (ref 0–4.99)

## 2024-02-12 LAB
ELECTRONICALLY SIGNED BY: NORMAL
JAK2 V617F INTERPRETATION: NORMAL
JAK2 V617F RESULT: NOT DETECTED

## 2024-02-14 ENCOUNTER — OFFICE VISIT (OUTPATIENT)
Dept: PRIMARY CARE | Facility: CLINIC | Age: 44
End: 2024-02-14
Payer: COMMERCIAL

## 2024-02-14 VITALS
OXYGEN SATURATION: 98 % | HEART RATE: 93 BPM | TEMPERATURE: 98 F | SYSTOLIC BLOOD PRESSURE: 120 MMHG | DIASTOLIC BLOOD PRESSURE: 60 MMHG | HEIGHT: 64 IN | BODY MASS INDEX: 38.62 KG/M2 | WEIGHT: 226.2 LBS

## 2024-02-14 DIAGNOSIS — F41.9 ANXIETY: Primary | ICD-10-CM

## 2024-02-14 PROCEDURE — 99213 OFFICE O/P EST LOW 20 MIN: CPT | Performed by: FAMILY MEDICINE

## 2024-02-14 PROCEDURE — 1036F TOBACCO NON-USER: CPT | Performed by: FAMILY MEDICINE

## 2024-02-14 RX ORDER — UBIDECARENONE 75 MG
500 CAPSULE ORAL DAILY
COMMUNITY
End: 2024-03-06 | Stop reason: SDUPTHER

## 2024-02-14 RX ORDER — ST. JOHN'S WORT 300 MG
1 CAPSULE ORAL DAILY
COMMUNITY

## 2024-02-14 ASSESSMENT — PATIENT HEALTH QUESTIONNAIRE - PHQ9
SUM OF ALL RESPONSES TO PHQ9 QUESTIONS 1 AND 2: 0
1. LITTLE INTEREST OR PLEASURE IN DOING THINGS: NOT AT ALL
2. FEELING DOWN, DEPRESSED OR HOPELESS: NOT AT ALL

## 2024-02-14 ASSESSMENT — PAIN SCALES - GENERAL: PAINLEVEL: 0-NO PAIN

## 2024-02-14 NOTE — PROGRESS NOTES
"Subjective   Patient ID: Maciej Noyola is a 43 y.o. female who presents for want to go over labs from other dr.    HPI     Review of Systems    Objective   /60   Pulse 93   Temp 36.7 °C (98 °F)   Ht 1.626 m (5' 4\")   Wt 103 kg (226 lb 3.2 oz)   LMP  (LMP Unknown)   SpO2 98%   BMI 38.83 kg/m²     Physical Exam    Assessment/Plan          "

## 2024-02-16 ENCOUNTER — INFUSION (OUTPATIENT)
Dept: HEMATOLOGY/ONCOLOGY | Facility: CLINIC | Age: 44
End: 2024-02-16
Payer: COMMERCIAL

## 2024-02-16 VITALS
OXYGEN SATURATION: 97 % | DIASTOLIC BLOOD PRESSURE: 85 MMHG | BODY MASS INDEX: 38.58 KG/M2 | SYSTOLIC BLOOD PRESSURE: 122 MMHG | WEIGHT: 224.76 LBS | HEART RATE: 94 BPM | TEMPERATURE: 97.5 F

## 2024-02-16 DIAGNOSIS — D50.0 IRON DEFICIENCY ANEMIA DUE TO CHRONIC BLOOD LOSS: ICD-10-CM

## 2024-02-16 LAB
ERYTHROCYTE [DISTWIDTH] IN BLOOD BY AUTOMATED COUNT: 15.1 % (ref 11.5–14.5)
FERRITIN SERPL-MCNC: 43 NG/ML (ref 8–150)
HCT VFR BLD AUTO: 42.2 % (ref 36–46)
HGB BLD-MCNC: 13.5 G/DL (ref 12–16)
IRON SATN MFR SERPL: 12 % (ref 25–45)
IRON SERPL-MCNC: 43 UG/DL (ref 35–150)
MCH RBC QN AUTO: 25.6 PG (ref 26–34)
MCHC RBC AUTO-ENTMCNC: 32 G/DL (ref 32–36)
MCV RBC AUTO: 80 FL (ref 80–100)
NRBC BLD-RTO: 0 /100 WBCS (ref 0–0)
PLATELET # BLD AUTO: 424 X10*3/UL (ref 150–450)
RBC # BLD AUTO: 5.28 X10*6/UL (ref 4–5.2)
TIBC SERPL-MCNC: 372 UG/DL (ref 240–445)
UIBC SERPL-MCNC: 329 UG/DL (ref 110–370)
WBC # BLD AUTO: 8.4 X10*3/UL (ref 4.4–11.3)

## 2024-02-16 PROCEDURE — 85027 COMPLETE CBC AUTOMATED: CPT

## 2024-02-16 PROCEDURE — 2500000004 HC RX 250 GENERAL PHARMACY W/ HCPCS (ALT 636 FOR OP/ED): Performed by: INTERNAL MEDICINE

## 2024-02-16 PROCEDURE — 83540 ASSAY OF IRON: CPT | Mod: WESLAB

## 2024-02-16 PROCEDURE — 82728 ASSAY OF FERRITIN: CPT | Mod: WESLAB

## 2024-02-16 PROCEDURE — 96365 THER/PROPH/DIAG IV INF INIT: CPT | Mod: INF

## 2024-02-16 RX ORDER — EPINEPHRINE 0.3 MG/.3ML
0.3 INJECTION SUBCUTANEOUS EVERY 5 MIN PRN
Status: CANCELLED | OUTPATIENT
Start: 2024-02-21

## 2024-02-16 RX ORDER — FAMOTIDINE 10 MG/ML
20 INJECTION INTRAVENOUS ONCE AS NEEDED
Status: DISCONTINUED | OUTPATIENT
Start: 2024-02-16 | End: 2024-02-16 | Stop reason: HOSPADM

## 2024-02-16 RX ORDER — ALBUTEROL SULFATE 0.83 MG/ML
3 SOLUTION RESPIRATORY (INHALATION) AS NEEDED
Status: DISCONTINUED | OUTPATIENT
Start: 2024-02-16 | End: 2024-02-16 | Stop reason: HOSPADM

## 2024-02-16 RX ORDER — DIPHENHYDRAMINE HYDROCHLORIDE 50 MG/ML
50 INJECTION INTRAMUSCULAR; INTRAVENOUS AS NEEDED
Status: CANCELLED | OUTPATIENT
Start: 2024-02-21

## 2024-02-16 RX ORDER — DIPHENHYDRAMINE HYDROCHLORIDE 50 MG/ML
50 INJECTION INTRAMUSCULAR; INTRAVENOUS AS NEEDED
Status: DISCONTINUED | OUTPATIENT
Start: 2024-02-16 | End: 2024-02-16 | Stop reason: HOSPADM

## 2024-02-16 RX ORDER — EPINEPHRINE 0.3 MG/.3ML
0.3 INJECTION SUBCUTANEOUS EVERY 5 MIN PRN
Status: DISCONTINUED | OUTPATIENT
Start: 2024-02-16 | End: 2024-02-16 | Stop reason: HOSPADM

## 2024-02-16 RX ORDER — ALBUTEROL SULFATE 0.83 MG/ML
3 SOLUTION RESPIRATORY (INHALATION) AS NEEDED
Status: CANCELLED | OUTPATIENT
Start: 2024-02-21

## 2024-02-16 RX ORDER — FAMOTIDINE 10 MG/ML
20 INJECTION INTRAVENOUS ONCE AS NEEDED
Status: CANCELLED | OUTPATIENT
Start: 2024-02-21

## 2024-02-16 RX ADMIN — IRON SUCROSE 300 MG: 20 INJECTION, SOLUTION INTRAVENOUS at 13:02

## 2024-02-16 ASSESSMENT — PAIN SCALES - GENERAL: PAINLEVEL: 0-NO PAIN

## 2024-02-19 PROBLEM — E66.9 OBESITY WITH BODY MASS INDEX 30 OR GREATER: Status: ACTIVE | Noted: 2023-12-12

## 2024-02-19 PROBLEM — G47.10 HYPERSOMNIA: Status: ACTIVE | Noted: 2023-12-12

## 2024-02-19 PROBLEM — F32.9 MAJOR DEPRESSIVE DISORDER WITH SINGLE EPISODE: Status: ACTIVE | Noted: 2023-12-12

## 2024-02-19 PROBLEM — J01.00 ACUTE MAXILLARY SINUSITIS, UNSPECIFIED: Status: ACTIVE | Noted: 2024-02-19

## 2024-02-19 PROBLEM — C85.10 B-CELL LYMPHOMA (MULTI): Status: ACTIVE | Noted: 2022-12-29

## 2024-02-20 NOTE — PROGRESS NOTES
"Subjective   Patient ID: Maciej Noyola is a 43 y.o. female who presents for want to go over labs from other dr.    Chronic stable anxiety         Review of Systems   Constitutional:  Negative for fever.        Also see HPI   Eyes:  Negative for visual disturbance.   Respiratory:  Negative for shortness of breath.    Cardiovascular:  Negative for chest pain.   Gastrointestinal:  Negative for diarrhea and nausea.   Endocrine: Negative.    Genitourinary:  Negative for difficulty urinating.   Skin:  Negative for rash.   Neurological:  Negative for dizziness.        No focal deficits   Psychiatric/Behavioral:  Negative for suicidal ideas.    All other systems reviewed and are negative.      Objective   /60   Pulse 93   Temp 36.7 °C (98 °F)   Ht 1.626 m (5' 4\")   Wt 103 kg (226 lb 3.2 oz)   LMP  (LMP Unknown)   SpO2 98%   BMI 38.83 kg/m²     Physical Exam  Vitals and nursing note reviewed.   Constitutional:       Appearance: Normal appearance.   HENT:      Head: Normocephalic and atraumatic.   Eyes:      Extraocular Movements: Extraocular movements intact.      Conjunctiva/sclera: Conjunctivae normal.   Cardiovascular:      Rate and Rhythm: Normal rate and regular rhythm.      Heart sounds: Normal heart sounds.   Pulmonary:      Effort: Pulmonary effort is normal.      Breath sounds: Normal breath sounds.      Comments: Lungs essentially CTA b/l  Abdominal:      General: There is no distension.      Palpations: Abdomen is soft. There is no mass.      Tenderness: There is no abdominal tenderness.   Musculoskeletal:      Right lower leg: No edema.      Left lower leg: No edema.   Skin:     Coloration: Skin is not jaundiced.      Findings: No rash.   Neurological:      General: No focal deficit present.      Mental Status: She is alert and oriented to person, place, and time.   Psychiatric:         Mood and Affect: Mood normal.         Behavior: Behavior normal.         Thought Content: Thought content normal. "         Judgment: Judgment normal.         Assessment/Plan   There are no diagnoses linked to this encounter.

## 2024-02-23 ENCOUNTER — APPOINTMENT (OUTPATIENT)
Dept: HEMATOLOGY/ONCOLOGY | Facility: CLINIC | Age: 44
End: 2024-02-23
Payer: COMMERCIAL

## 2024-02-27 ENCOUNTER — INFUSION (OUTPATIENT)
Dept: HEMATOLOGY/ONCOLOGY | Facility: CLINIC | Age: 44
End: 2024-02-27
Payer: COMMERCIAL

## 2024-02-27 VITALS
OXYGEN SATURATION: 100 % | HEART RATE: 80 BPM | DIASTOLIC BLOOD PRESSURE: 85 MMHG | SYSTOLIC BLOOD PRESSURE: 145 MMHG | TEMPERATURE: 96.3 F | BODY MASS INDEX: 38.3 KG/M2 | RESPIRATION RATE: 18 BRPM | WEIGHT: 223.11 LBS

## 2024-02-27 DIAGNOSIS — D50.0 IRON DEFICIENCY ANEMIA DUE TO CHRONIC BLOOD LOSS: ICD-10-CM

## 2024-02-27 PROCEDURE — 2500000004 HC RX 250 GENERAL PHARMACY W/ HCPCS (ALT 636 FOR OP/ED): Performed by: INTERNAL MEDICINE

## 2024-02-27 PROCEDURE — 96365 THER/PROPH/DIAG IV INF INIT: CPT | Mod: INF

## 2024-02-27 RX ORDER — HEPARIN SODIUM,PORCINE/PF 10 UNIT/ML
50 SYRINGE (ML) INTRAVENOUS AS NEEDED
Status: DISCONTINUED | OUTPATIENT
Start: 2024-02-27 | End: 2024-02-27 | Stop reason: HOSPADM

## 2024-02-27 RX ORDER — DIPHENHYDRAMINE HYDROCHLORIDE 50 MG/ML
50 INJECTION INTRAMUSCULAR; INTRAVENOUS AS NEEDED
Status: CANCELLED | OUTPATIENT
Start: 2024-02-28

## 2024-02-27 RX ORDER — EPINEPHRINE 0.3 MG/.3ML
0.3 INJECTION SUBCUTANEOUS EVERY 5 MIN PRN
Status: DISCONTINUED | OUTPATIENT
Start: 2024-02-27 | End: 2024-02-27 | Stop reason: HOSPADM

## 2024-02-27 RX ORDER — EPINEPHRINE 0.3 MG/.3ML
0.3 INJECTION SUBCUTANEOUS EVERY 5 MIN PRN
Status: CANCELLED | OUTPATIENT
Start: 2024-02-28

## 2024-02-27 RX ORDER — DIPHENHYDRAMINE HYDROCHLORIDE 50 MG/ML
50 INJECTION INTRAMUSCULAR; INTRAVENOUS AS NEEDED
Status: DISCONTINUED | OUTPATIENT
Start: 2024-02-27 | End: 2024-02-27 | Stop reason: HOSPADM

## 2024-02-27 RX ORDER — HEPARIN 100 UNIT/ML
500 SYRINGE INTRAVENOUS AS NEEDED
Status: DISCONTINUED | OUTPATIENT
Start: 2024-02-27 | End: 2024-02-27 | Stop reason: HOSPADM

## 2024-02-27 RX ORDER — ALBUTEROL SULFATE 0.83 MG/ML
3 SOLUTION RESPIRATORY (INHALATION) AS NEEDED
Status: DISCONTINUED | OUTPATIENT
Start: 2024-02-27 | End: 2024-02-27 | Stop reason: HOSPADM

## 2024-02-27 RX ORDER — FAMOTIDINE 10 MG/ML
20 INJECTION INTRAVENOUS ONCE AS NEEDED
Status: DISCONTINUED | OUTPATIENT
Start: 2024-02-27 | End: 2024-02-27 | Stop reason: HOSPADM

## 2024-02-27 RX ORDER — HEPARIN 100 UNIT/ML
500 SYRINGE INTRAVENOUS AS NEEDED
Status: CANCELLED | OUTPATIENT
Start: 2024-02-27

## 2024-02-27 RX ORDER — HEPARIN SODIUM,PORCINE/PF 10 UNIT/ML
50 SYRINGE (ML) INTRAVENOUS AS NEEDED
Status: CANCELLED | OUTPATIENT
Start: 2024-02-27

## 2024-02-27 RX ORDER — FAMOTIDINE 10 MG/ML
20 INJECTION INTRAVENOUS ONCE AS NEEDED
Status: CANCELLED | OUTPATIENT
Start: 2024-02-28

## 2024-02-27 RX ORDER — ALBUTEROL SULFATE 0.83 MG/ML
3 SOLUTION RESPIRATORY (INHALATION) AS NEEDED
Status: CANCELLED | OUTPATIENT
Start: 2024-02-28

## 2024-02-27 RX ADMIN — IRON SUCROSE 300 MG: 20 INJECTION, SOLUTION INTRAVENOUS at 08:49

## 2024-02-27 ASSESSMENT — PAIN SCALES - GENERAL: PAINLEVEL: 0-NO PAIN

## 2024-03-06 ENCOUNTER — OFFICE VISIT (OUTPATIENT)
Dept: HEMATOLOGY/ONCOLOGY | Facility: CLINIC | Age: 44
End: 2024-03-06
Payer: COMMERCIAL

## 2024-03-06 VITALS
SYSTOLIC BLOOD PRESSURE: 119 MMHG | HEART RATE: 77 BPM | BODY MASS INDEX: 38.26 KG/M2 | WEIGHT: 222.88 LBS | RESPIRATION RATE: 18 BRPM | TEMPERATURE: 96.1 F | OXYGEN SATURATION: 98 % | DIASTOLIC BLOOD PRESSURE: 85 MMHG

## 2024-03-06 DIAGNOSIS — D50.0 IRON DEFICIENCY ANEMIA DUE TO CHRONIC BLOOD LOSS: ICD-10-CM

## 2024-03-06 DIAGNOSIS — C85.10 LOW GRADE B-CELL LYMPHOMA (MULTI): Primary | ICD-10-CM

## 2024-03-06 LAB
ALBUMIN SERPL BCP-MCNC: 4.1 G/DL (ref 3.4–5)
ALP SERPL-CCNC: 90 U/L (ref 33–110)
ALT SERPL W P-5'-P-CCNC: 13 U/L (ref 7–45)
ANION GAP SERPL CALC-SCNC: 12 MMOL/L (ref 10–20)
AST SERPL W P-5'-P-CCNC: 13 U/L (ref 9–39)
BASOPHILS # BLD AUTO: 0.13 X10*3/UL (ref 0–0.1)
BASOPHILS NFR BLD AUTO: 2 %
BILIRUB SERPL-MCNC: 0.3 MG/DL (ref 0–1.2)
BUN SERPL-MCNC: 9 MG/DL (ref 6–23)
CALCIUM SERPL-MCNC: 9 MG/DL (ref 8.6–10.3)
CHLORIDE SERPL-SCNC: 104 MMOL/L (ref 98–107)
CO2 SERPL-SCNC: 27 MMOL/L (ref 21–32)
CREAT SERPL-MCNC: 0.78 MG/DL (ref 0.5–1.05)
EGFRCR SERPLBLD CKD-EPI 2021: >90 ML/MIN/1.73M*2
EOSINOPHIL # BLD AUTO: 0.17 X10*3/UL (ref 0–0.7)
EOSINOPHIL NFR BLD AUTO: 2.6 %
ERYTHROCYTE [DISTWIDTH] IN BLOOD BY AUTOMATED COUNT: 16.2 % (ref 11.5–14.5)
GLUCOSE SERPL-MCNC: 89 MG/DL (ref 74–99)
HCT VFR BLD AUTO: 41.5 % (ref 36–46)
HGB BLD-MCNC: 13.2 G/DL (ref 12–16)
IMM GRANULOCYTES # BLD AUTO: 0.03 X10*3/UL (ref 0–0.7)
IMM GRANULOCYTES NFR BLD AUTO: 0.5 % (ref 0–0.9)
IRON SATN MFR SERPL: 17 % (ref 25–45)
IRON SERPL-MCNC: 50 UG/DL (ref 35–150)
LYMPHOCYTES # BLD AUTO: 1.35 X10*3/UL (ref 1.2–4.8)
LYMPHOCYTES NFR BLD AUTO: 20.3 %
MCH RBC QN AUTO: 25.8 PG (ref 26–34)
MCHC RBC AUTO-ENTMCNC: 31.8 G/DL (ref 32–36)
MCV RBC AUTO: 81 FL (ref 80–100)
MONOCYTES # BLD AUTO: 0.53 X10*3/UL (ref 0.1–1)
MONOCYTES NFR BLD AUTO: 8 %
NEUTROPHILS # BLD AUTO: 4.45 X10*3/UL (ref 1.2–7.7)
NEUTROPHILS NFR BLD AUTO: 66.6 %
NRBC BLD-RTO: 0 /100 WBCS (ref 0–0)
PLATELET # BLD AUTO: 416 X10*3/UL (ref 150–450)
POTASSIUM SERPL-SCNC: 3.9 MMOL/L (ref 3.5–5.3)
PROT SERPL-MCNC: 7.2 G/DL (ref 6.4–8.2)
RBC # BLD AUTO: 5.12 X10*6/UL (ref 4–5.2)
SODIUM SERPL-SCNC: 139 MMOL/L (ref 136–145)
TIBC SERPL-MCNC: 302 UG/DL (ref 240–445)
UIBC SERPL-MCNC: 252 UG/DL (ref 110–370)
WBC # BLD AUTO: 6.7 X10*3/UL (ref 4.4–11.3)

## 2024-03-06 PROCEDURE — 83540 ASSAY OF IRON: CPT | Performed by: INTERNAL MEDICINE

## 2024-03-06 PROCEDURE — 85025 COMPLETE CBC W/AUTO DIFF WBC: CPT | Performed by: INTERNAL MEDICINE

## 2024-03-06 PROCEDURE — 88185 FLOWCYTOMETRY/TC ADD-ON: CPT | Mod: TC | Performed by: INTERNAL MEDICINE

## 2024-03-06 PROCEDURE — 88189 FLOWCYTOMETRY/READ 16 & >: CPT | Performed by: INTERNAL MEDICINE

## 2024-03-06 PROCEDURE — 1036F TOBACCO NON-USER: CPT | Performed by: INTERNAL MEDICINE

## 2024-03-06 PROCEDURE — 99214 OFFICE O/P EST MOD 30 MIN: CPT | Performed by: INTERNAL MEDICINE

## 2024-03-06 PROCEDURE — 80053 COMPREHEN METABOLIC PANEL: CPT | Performed by: INTERNAL MEDICINE

## 2024-03-06 ASSESSMENT — ENCOUNTER SYMPTOMS
DEPRESSION: 0
DIARRHEA: 0
SHORTNESS OF BREATH: 0
SLEEP DISTURBANCE: 0
HEADACHES: 1
LOSS OF SENSATION IN FEET: 0
BACK PAIN: 0
TROUBLE SWALLOWING: 0
BRUISES/BLEEDS EASILY: 1
FEVER: 0
FATIGUE: 1
CHILLS: 0
OCCASIONAL FEELINGS OF UNSTEADINESS: 0
DIAPHORESIS: 0
UNEXPECTED WEIGHT CHANGE: 0
DYSURIA: 0
CONSTIPATION: 0

## 2024-03-06 ASSESSMENT — PAIN SCALES - GENERAL: PAINLEVEL: 2

## 2024-03-06 NOTE — PROGRESS NOTES
Patient ID: Maciej Noyola is a 43 y.o. female.    Subjective    HPI  Awoke after [place] with back itchy, spread to stomach, armpits  She has tried lotions, aveeno, aquaphor, cortisone.  No rash.           43-year-old woman with underlying depression and morbid obesity who was noted by her partner to have a lump in the lumbar spine region in early 2019. She thought  initially that this was a muscle issue, thus she saw a chiropractor. This worsened, thus she underwent further evaluation. The initial ultrasound on February 6, 2019 showed a 4 x 2.5 cm lesion. Subsequent MRI spine showed a lobulated appearing cystic  focus measuring up to 3.6 cm. No spinal cord/vertebral body involvement. On February 28, patient underwent an excision of the lumbar mass. 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 Flow cytometry showed a monoclonal B-cell  population without CD5, CD10 or CD11 expression, consistent with B-cell lymphoma. 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 Patient underwent a PET/CT on 03/25/2019, which showed diffuse hypermetabolic marrow  activity involvingvertebral bodies throughout the spine and pelvic bone. There was moderate hypermetabolic activity involving thespleen with maximum SUV of 5.3. This was all thought to be due to acute mononucleosis. The patient underwent a repeat PET/CT  in June 2019, which was completely negative. She then subsequentlyunderwent radiation therapy by Dr. Crowe to the soft tissue of the back. She received 26 Gy total, completing onAugust 16, 2019. She has since remained on observation. The patient complained  of right breast pruritus in April 2020. MRI breast was ordered, which showed a 1.3cmoval mass at the 3 o'clock position. Biopsy on May 8, 2020 revealed a low-grade marginal B cell lymphoma. She thenunderwent a PET/CT on June 17, 2020. This showed moderate  activity with an SUV of 2.4 corresponding to  an ill-definedmass 2.6 cm in the posterior lateral aspect at the 9 o'clock axis of the right breast. There was also modest activity inthe uterus, although she was menstruating. There was asymmetric hypermetabolic  bone marrow activity as well, including in the thoracic and lumbar spine region, questioning low-grade lymphoma. She received four doses of rituximab in 2020.   She is going through premenopause.  Some hot flashes.  No fevers, no weight loss.    No bone aches or pains   22, PET/CT showed stable disease from the prior, with persistent lowactivity in the thoracic and lumbar spine region and in that single breast region.       Interval follow-up: No B symptoms of fever, drenching night sweats, weight loss.  No new skin lesions.  No breast lesions.     FAMILY HISTORY: Maternal aunt breast cancer in her 70s, maternal uncle with pancreatic cancer in his 50s.   mother  of leukemia, 2019, age 74     SOCIAL HISTORY: No smoking. No alcohol.      PAST SURGICAL HISTORY: Left back excisional biopsy, right knee surgery, dental extraction     Objective    BSA: There is no height or weight on file to calculate BSA.  LMP  (LMP Unknown)    Review of Systems   Constitutional:  Positive for fatigue. Negative for chills, diaphoresis, fever and unexpected weight change.   HENT:  Negative for trouble swallowing.    Eyes:  Negative for visual disturbance.   Respiratory:  Negative for shortness of breath.    Cardiovascular:  Negative for leg swelling.   Gastrointestinal:  Negative for constipation and diarrhea.   Endocrine: Negative for cold intolerance.   Genitourinary:  Negative for dysuria.   Musculoskeletal:  Negative for back pain.   Skin:  Positive for rash.   Allergic/Immunologic: Negative for environmental allergies.   Neurological:  Positive for headaches.   Hematological:  Bruises/bleeds easily.   Psychiatric/Behavioral:  Negative for sleep disturbance.        Physical Exam  Constitutional:        Appearance: She is obese.   HENT:      Head: Normocephalic.      Right Ear: External ear normal.      Left Ear: External ear normal.      Nose: Nose normal.      Mouth/Throat:      Mouth: Mucous membranes are moist.      Pharynx: Oropharynx is clear.   Eyes:      Extraocular Movements: Extraocular movements intact.      Pupils: Pupils are equal, round, and reactive to light.   Cardiovascular:      Rate and Rhythm: Normal rate and regular rhythm.   Pulmonary:      Effort: Pulmonary effort is normal.      Breath sounds: Normal breath sounds.   Abdominal:      General: Abdomen is flat. Bowel sounds are normal.   Musculoskeletal:         General: Normal range of motion.      Cervical back: Normal range of motion.   Skin:     General: Skin is warm.      Findings: Erythema present.   Neurological:      General: No focal deficit present.      Mental Status: She is alert and oriented to person, place, and time.   Psychiatric:         Mood and Affect: Mood normal.         Behavior: Behavior normal.             Assessment/Plan   Summary: 43-year-old woman with underlying depression versus ADD, obesity and marginal zone lymphoma status post radiation therapy.        Marginal zone lymphoma, cutaneous[back]  She presented in 2019 with an enlarging lower back lesion. She ultimately underwent excision of this lesion in February 2019, which showed a low grade B-cell lymphoma favoring a daron marginal zone lymphoma. PET/CT in March 2019 showed diffuse activity  throughout the spinous processes and in the spleen which was thought related to an acute infectious mononucleosis.   Repeat PET/CT in June 2019 was negative. She underwent radiation therapy to the back region, completing in August 2019.   She underwent MRI breast imaging for abnormal breast pruritus in April 2020, which revealed a1.3cm mass in the region. Biopsy confirmed recurrent marginal zone lymphoma.   PET/CT in June 2020 showeduptake in that breast area, questionably in  the uterine region and in the bones. Ultrasound of the pelvic region was negative.   She underwent a four week course of Rituximab.   Repeat PET/CT by November 2020 and breast imagingwas stable.   1. Recurrent marginal zone B-cell lymphoma. We will continue on observation. We will plan to repeat a PET/CTin June 2021. The rationale was that the CT imaging was unable to detect disease progression previously, especially in the spine region. She is  otherwise on observation alone. If she progresses in the future, we canalways consider lenalidomide with rituximab.  PET scan, 10/19/2023, no abnormal hypermetabolic foci to suggest lymphoma  diffuse heterogenous uptake within the bone marrow. This  finding can be seen with activated bone marrow, which is often seen  in a patient on chemotherapy. There is no evidence of focal  hypermetabolic osseous lesions to suggest metastasis.  Plan:    - continue with visits every 6 months   - labs every visit: CBC, Comp, LDH   - SPEP  - continue with yearly or twice yearly mammogram in January  PET scan in November 2024        Saw Vik Rojo in January      2. Mild leukopenia, resolved.      3. Melena. No recurrence  She did have a GI colonoscopy which was unremarkable in the fall of 2020.      4. Migraines. Headaches back of head.  Recommend hands and knee     5. Depression versus ADD. She will continue Qelbree® (viloxazine)      6. Abnormal scalp lesions.   These have resolved.  No recurrence since switching to Dove shampoo.       7. Abnormal breast lesions. Repeat mammography was normal as of November 2020.  Repeat mammogram in January 2024 was birads 2        Group II - pruritus on nondiseased (noninflamed skin), such as pruritus related to end-stage kidney disease, cholestatic pruritus, or neuropathic pruritus  Onset, December 2023  Update: she is starting to develop blanching erythema  Systemic - Systemic causes of pruritus consist of disorders that affect other organ systems,  "such as chronic renal failure, liver disease, hematologic or lymphoproliferative disorders, and malignancy. Drug-induced pruritus is also included in this category.[No new medications]  Endocrine disease -- Pruritus may occur in association with thyroid disease and diabetes:  ?Thyroid disorders - Generalized pruritus is seen commonly in patients with thyrotoxicosis, particularly in Graves' disease [46,49]. Possible mechanisms include reduction of the itch threshold mediated by vasodilation, as well as activation of kinin pathways secondary to an increased metabolic rate. Hypothyroidism is less frequently associated with itch and is associated with skin xerosis [46]. (See \"Overview of the clinical manifestations of hyperthyroidism in adults\" and \"Clinical manifestations of hypothyroidism\".)  ?Diabetes   PV  Carcinoid  Systemic rheumatic disease -- Itch is a common symptom of dermatomyositis, systemic sclerosis (scleroderma), and primary Sjögren's syndrome (P-SS) [63-69]. The pathophysiology of chronic itch in systemic rheumatic diseases is not well understood; however, P-SS is highly associated with skin xerosis. [No rheumatological condition]  ?Complete blood count with differential to evaluate for evidence of malignancy, myeloproliferative disease, or iron deficiency  ?Serum bilirubin, transaminases, and alkaline phosphatase to evaluate for evidence of liver disease  ?Thyroid-stimulating hormone to evaluate for evidence of a thyroid disorder  ?Blood urea nitrogen and creatinine to evaluate for renal disease  ?Chest radiograph  No wheezing, no diarrhea  CMP, TSH 2.07, JAK2 negative, IgE <2, tryptase 5.6, , GGT 42, celiac: Deaminated gliadin not detected,   Ferritin 43 [LILIANA (December, 2023) would consider this low]  TIBC 372, 12% saturation (2-16-24)   Energy better.  Itching no better, after IV iron.     PLAN: 5HIAA, Flow,   Dermatology appointment at the end of April       Iron status  Ferritin 43 (2-16-24), " then IV iron  PLAN: reassess ferritin  3 months rtc       Lopez Hawkins MD           no

## 2024-03-06 NOTE — PROGRESS NOTES
Pt seen in office today for a follow up visit with Dr. Hawkins  for management of her low grade B cell lymphoma/ MARIE.  She is  without complaints today and denies pain.     Medications, pharmacy preference and allergies were reviewed with patient and updated in the medical record.     Per orders, she is to have labs obtained in office today. She will return to clinic in 3 months for follow up, with labs to be collected the day prior. Patient is aware to come the day prior to any  lab that has walk-in service to have her labs drawn prior to her appointment.    Our contact information was given to patient and they were encouraged to contact us with any questions or concerns.     Patient verbalized understanding and agreement regarding discussed information via verbal feedback. Pt escorted to scheduling.

## 2024-03-08 LAB
CELL COUNT (BLOOD): 6.7 X10*3/UL
CELL POPULATIONS: NORMAL
DIAGNOSIS: NORMAL
FLOW DIFFERENTIAL: NORMAL
FLOW TEST ORDERED: NORMAL
LAB TEST METHOD: NORMAL
NUMBER OF CELLS COLLECTED: NORMAL PER TUBE
PATH REPORT.TOTAL CANCER: NORMAL
SIGNATURE COMMENT: NORMAL
SPECIMEN VIABILITY: NORMAL

## 2024-03-13 ASSESSMENT — ENCOUNTER SYMPTOMS
DIARRHEA: 0
SHORTNESS OF BREATH: 0
DIFFICULTY URINATING: 0
FEVER: 0
NAUSEA: 0
ENDOCRINE NEGATIVE: 1
DIZZINESS: 0

## 2024-04-29 ENCOUNTER — OFFICE VISIT (OUTPATIENT)
Dept: DERMATOLOGY | Facility: HOSPITAL | Age: 44
End: 2024-04-29
Payer: COMMERCIAL

## 2024-04-29 DIAGNOSIS — L20.89 OTHER ATOPIC DERMATITIS: Primary | ICD-10-CM

## 2024-04-29 DIAGNOSIS — L30.4 INTERTRIGO: ICD-10-CM

## 2024-04-29 PROCEDURE — 99204 OFFICE O/P NEW MOD 45 MIN: CPT | Performed by: DERMATOLOGY

## 2024-04-29 PROCEDURE — 99214 OFFICE O/P EST MOD 30 MIN: CPT | Performed by: DERMATOLOGY

## 2024-04-29 PROCEDURE — 1036F TOBACCO NON-USER: CPT | Performed by: DERMATOLOGY

## 2024-04-29 RX ORDER — TRIAMCINOLONE ACETONIDE 1 MG/G
CREAM TOPICAL 2 TIMES DAILY
Qty: 454 G | Refills: 1 | Status: SHIPPED | OUTPATIENT
Start: 2024-04-29

## 2024-04-29 RX ORDER — KETOCONAZOLE 20 MG/G
CREAM TOPICAL 2 TIMES DAILY
Qty: 60 G | Refills: 11 | Status: SHIPPED | OUTPATIENT
Start: 2024-04-29

## 2024-04-29 ASSESSMENT — DERMATOLOGY QUALITY OF LIFE (QOL) ASSESSMENT
RATE HOW BOTHERED YOU ARE BY SYMPTOMS OF YOUR SKIN PROBLEM (EG, ITCHING, STINGING BURNING, HURTING OR SKIN IRRITATION): 6 - ALWAYS BOTHERED
DATE THE QUALITY-OF-LIFE ASSESSMENT WAS COMPLETED: 66959
RATE HOW BOTHERED YOU ARE BY EFFECTS OF YOUR SKIN PROBLEMS ON YOUR ACTIVITIES (EG, GOING OUT, ACCOMPLISHING WHAT YOU WANT, WORK ACTIVITIES OR YOUR RELATIONSHIPS WITH OTHERS): 4
RATE HOW EMOTIONALLY BOTHERED YOU ARE BY YOUR SKIN PROBLEM (FOR EXAMPLE, WORRY, EMBARRASSMENT, FRUSTRATION): 3
RATE HOW BOTHERED YOU ARE BY EFFECTS OF YOUR SKIN PROBLEMS ON YOUR ACTIVITIES (EG, GOING OUT, ACCOMPLISHING WHAT YOU WANT, WORK ACTIVITIES OR YOUR RELATIONSHIPS WITH OTHERS): 4
RATE HOW BOTHERED YOU ARE BY SYMPTOMS OF YOUR SKIN PROBLEM (EG, ITCHING, STINGING BURNING, HURTING OR SKIN IRRITATION): 0 - NEVER BOTHERED
RATE HOW EMOTIONALLY BOTHERED YOU ARE BY YOUR SKIN PROBLEM (FOR EXAMPLE, WORRY, EMBARRASSMENT, FRUSTRATION): 3
ARE THERE EXCLUSIONS OR EXCEPTIONS FOR THE QUALITY OF LIFE ASSESSMENT: NO
RATE HOW BOTHERED YOU ARE BY EFFECTS OF YOUR SKIN PROBLEMS ON YOUR ACTIVITIES (EG, GOING OUT, ACCOMPLISHING WHAT YOU WANT, WORK ACTIVITIES OR YOUR RELATIONSHIPS WITH OTHERS): 0 - NEVER BOTHERED
RATE HOW EMOTIONALLY BOTHERED YOU ARE BY YOUR SKIN PROBLEM (FOR EXAMPLE, WORRY, EMBARRASSMENT, FRUSTRATION): 0 - NEVER BOTHERED
RATE HOW BOTHERED YOU ARE BY SYMPTOMS OF YOUR SKIN PROBLEM (EG, ITCHING, STINGING BURNING, HURTING OR SKIN IRRITATION): 6 - ALWAYS BOTHERED

## 2024-04-29 ASSESSMENT — DERMATOLOGY PATIENT ASSESSMENT
DO YOU USE SUNSCREEN: OCCASIONALLY
DO YOU HAVE IRREGULAR MENSTRUAL CYCLES: NO
DO YOU HAVE ANY NEW OR CHANGING LESIONS: NO
ARE YOU ON BIRTH CONTROL: NO
DO YOU USE A TANNING BED: NO
ARE YOU TRYING TO GET PREGNANT: NO
HAVE YOU HAD OR DO YOU HAVE VASCULAR DISEASE: NO
HAVE YOU HAD OR DO YOU HAVE A STAPH INFECTION: NO
ARE YOU AN ORGAN TRANSPLANT RECIPIENT: NO

## 2024-04-29 ASSESSMENT — PATIENT GLOBAL ASSESSMENT (PGA): PATIENT GLOBAL ASSESSMENT: PATIENT GLOBAL ASSESSMENT:  1 - CLEAR

## 2024-04-29 ASSESSMENT — ITCH NUMERIC RATING SCALE
HOW SEVERE IS YOUR ITCHING?: 7
HOW SEVERE IS YOUR ITCHING?: 0

## 2024-04-29 NOTE — PROGRESS NOTES
Subjective     Maciej Noyola is a 43 y.o. female who presents for the following: Rash.     History of marginal zone B cell lymphoma on the lower back s/p excision 2/2019. PET/CT 3/2019 showed diffuse activity thought to be infecitous mononucleosis, repeat PET/CT 6/2019 was negative. Radiation completed 8/2019. MRI breast revealed a 1.3 cm mass which was confirmed as MZL by biopsy s/p 4 week course of rituximab. PET/CT 11/2020 stable. Follows with oncology every 6 months    She also has pruritus.  It started at the end of December, saw her primary care who gave her hydroxyzine 10mg without much improvement. She saw her oncologist who diagnosed iron deficiency anemia, she was infused iron and her Hb is normal, she is not sure it made a big improvement. Her kidney function is normal    In the past her MZL has been diagnosed with pruritus, but this is more generalized pruritus now      Review of Systems:  No other skin or systemic complaints other than what is documented elsewhere in the note.    The following portions of the chart were reviewed this encounter and updated as appropriate:          Skin Cancer History  No skin cancer on file.      Specialty Problems    None       Objective   Well appearing patient in no apparent distress; mood and affect are within normal limits.    A focused skin examination was performed. All findings within normal limits unless otherwise noted below.    Assessment/Plan   1. Other atopic dermatitis  Left Antecubital Fossa, Left Hip, Left Lower Back, Right Antecubital Fossa, Right Hip, Right Lower Back  Light pink macules and early thin patches symmetrically in antecubital fossae and around waistband with background of xerosis    Body surface area:  1%       -Discussed the nature of condition  - There is no evidence of MZL on exam today  - start to treat as eczema, on follow up conisder biopsy of areas not imprving      -Recommend to utilize gentle skin care habits with gentle  cleansers, limiting water exposure, and using liberal emollients. Recommend to use liberal emollients twice daily, one time applied immediately after shower while skin is still slightly damp. Use emollients to all areas of the body that may be affected and use whether the rash is active or not. Use prescription medications before applying emollients.   -Discussed with/information to the patient on the risks, benefits and alternatives of the usage of topical corticosteroids, including but not limited to: atrophy (thinning of the skin), striae (stretch marks), telangiectasia (blood vessel growth), and dyspigmentation (discoloration of the skin).  -Recommend to limit long-term use of topical corticosteroids to less than 14 days per month to reduce risk of side effects.  -Recommend:    Related Procedures  Follow Up In Dermatology - Established Patient    Related Medications  triamcinolone (Kenalog) 0.1 % cream  Apply topically 2 times a day. To all itchy areas on body for 2 weeks then decrease to remaining itchy spots another 2 weeks; repeat for spots up to 2 weeks at a time as needed for flares    2. Intertrigo  Left Inframammary Fold, Right Inframammary Fold  Light pink symmetric patches with early satellite macules    -Discussed nature of condition  -This is chronic condition and likely to recur  -Discussed condition is worsened by skin-to-skin contact and moisture. Recommend to keep the area as dry as possible. This may be accomplished by the use of cold air with a hair dryer on cold shot setting to blow cool air on the area after shower to ensure the skin is fully dry prior to cream application.  -Consider the use of InterDry fabric to absorb moisture where areas of skin contact skin.  -Recommend rx Hydrocortisone 2.5% cream to the affected area twice daily until the condition is improved and healing, then discontinue  -Recommend rx Ketoconazole 2% cream to the affected area twice daily until the condition is  improved, and then apply Ketoconazole 2% cream nightly at bedtime to fully dry skin for maintenance to prevent recurrence.  -Use of zinc oxide paste to fully dry skin may also be helpful to prevent recurrence as this provides a barrier between areas where skin contacts skin. May use Desitin, purple or blue tube.  -Discussed with/information given to the patient on the risks, benefits and alternatives of the usage of topical corticosteroids, including but not limited to: atrophy (thinning of the skin), striae (stretch marks), telangiectasia (blood vessel growth), and dyspigmentation (discoloration of the skin).  -Recommend to limit long-term use of topical corticosteroids to less than 14 days per month to reduce risk of side effects.      Related Medications  ketoconazole (NIZOral) 2 % cream  Apply topically 2 times a day. To itchy skin underneath breasts for 2-4 weeks, repeat as needed        Follow up 2-3 months

## 2024-04-29 NOTE — PATIENT INSTRUCTIONS
Dry Skin    Dry skin can occur at any age and for many reasons. In general, skin becomes drier as we age. It is drier in the winter months than in summer months, and drier in low-humidity climates than in high humidity climates. Skin looks and feels dry because it lacks water. However, your skin’s natural oils are necessary to prevent it from drying out. Our goal is to replace the oil in order to keep the water in your skin.    In some people, areas of seriously dry skin can lead to a condition called dermatitis in which the skin becomes inflamed. When dermatitis is present, your dermatologist may prescribe a corticosteroid cream or ointment. This cream is applied to the affected areas only. Discontinue the corticosteroid cream when the dermatitis clears. The use of a moisturizing lotion, cream, or ointment should be continued to help prevent recurrence of the dermatitis.    Avoid hot baths and showers -- hot water removes your natural skin oils more quickly.  Keep showers or bath brief (5-10 minutes).  Use a mild soap or cleanser (bar or liquid body wash):  Dove      Cetaphil   Vanicream  Aveeno  Oil of Olay     If you have eczema or sensitive skin, look for formulas that are for “sensitive skin” or “fragrance-free.” “Unscented” products may still contain perfumes.  If your skin is extra-dry, you may only need to use soap daily to the underarms and groin. Use soap to the whole body only 2-3 times weekly.  When you get out of the bath or shower, pat skin dry.  Use the “3-Minute Rule” after you pat yourself dry: apply a moisturizer within 3 minutes of getting out of the bath.  Good moisturizers:  CeraVe body cream     Petroleum jelly (Vaseline)  Cetaphil    Vegetable oil    Nutraderm    Olive oil    Mineral oil  Eucerin   Neutrogena Norwegian Formula    *Note:  In general, creams are better moisturizers than lotions.    You may need to reapply moisturizers 2-4 times a day.

## 2024-05-27 DIAGNOSIS — E55.9 VITAMIN D DEFICIENCY: Primary | ICD-10-CM

## 2024-06-03 RX ORDER — ERGOCALCIFEROL 1.25 MG/1
1 CAPSULE ORAL
Qty: 12 CAPSULE | Refills: 1 | Status: SHIPPED | OUTPATIENT
Start: 2024-06-09

## 2024-06-12 ENCOUNTER — OFFICE VISIT (OUTPATIENT)
Dept: PRIMARY CARE | Facility: CLINIC | Age: 44
End: 2024-06-12
Payer: COMMERCIAL

## 2024-06-12 ENCOUNTER — HOSPITAL ENCOUNTER (OUTPATIENT)
Dept: RADIOLOGY | Facility: HOSPITAL | Age: 44
Discharge: HOME | End: 2024-06-12
Payer: COMMERCIAL

## 2024-06-12 VITALS
OXYGEN SATURATION: 97 % | BODY MASS INDEX: 38.24 KG/M2 | TEMPERATURE: 98.2 F | HEART RATE: 72 BPM | HEIGHT: 64 IN | SYSTOLIC BLOOD PRESSURE: 130 MMHG | DIASTOLIC BLOOD PRESSURE: 88 MMHG | WEIGHT: 224 LBS

## 2024-06-12 DIAGNOSIS — M79.641 PAIN IN BOTH HANDS: Primary | ICD-10-CM

## 2024-06-12 DIAGNOSIS — M79.641 PAIN IN BOTH HANDS: ICD-10-CM

## 2024-06-12 DIAGNOSIS — M79.642 PAIN IN BOTH HANDS: ICD-10-CM

## 2024-06-12 DIAGNOSIS — M79.642 PAIN IN BOTH HANDS: Primary | ICD-10-CM

## 2024-06-12 PROCEDURE — 73130 X-RAY EXAM OF HAND: CPT | Mod: 50

## 2024-06-12 PROCEDURE — 99213 OFFICE O/P EST LOW 20 MIN: CPT | Performed by: FAMILY MEDICINE

## 2024-06-12 RX ORDER — PREDNISONE 10 MG/1
TABLET ORAL DAILY
Qty: 21 TABLET | Refills: 0 | Status: SHIPPED | OUTPATIENT
Start: 2024-06-12 | End: 2024-06-24 | Stop reason: ALTCHOICE

## 2024-06-12 ASSESSMENT — PAIN SCALES - GENERAL: PAINLEVEL: 5

## 2024-06-12 NOTE — PROGRESS NOTES
"Subjective   Patient ID: Maciej Noyola is a 43 y.o. female who presents for Hand Pain (Pain and lump on Right hand shooting up the arm-went to [facility] in [place]- no XRay).    Bilateral hand pain sometimes worse with activities or making closed fist         Review of Systems   Constitutional:  Negative for fever.        Also see HPI   Eyes:  Negative for visual disturbance.   Respiratory:  Negative for shortness of breath.    Cardiovascular:  Negative for chest pain.   Gastrointestinal:  Negative for diarrhea and nausea.   Endocrine: Negative.    Genitourinary:  Negative for difficulty urinating.   Skin:  Negative for rash.   Neurological:  Negative for dizziness.        No focal deficits   Psychiatric/Behavioral:  Negative for suicidal ideas.    All other systems reviewed and are negative.      Objective   /88   Pulse 72   Temp 36.8 °C (98.2 °F)   Ht 1.626 m (5' 4\")   Wt 102 kg (224 lb)   SpO2 97%   BMI 38.45 kg/m²     Physical Exam  Vitals and nursing note reviewed.   Constitutional:       Appearance: Normal appearance.   HENT:      Head: Normocephalic and atraumatic.   Eyes:      Extraocular Movements: Extraocular movements intact.      Conjunctiva/sclera: Conjunctivae normal.   Cardiovascular:      Rate and Rhythm: Normal rate and regular rhythm.      Heart sounds: Normal heart sounds.   Pulmonary:      Effort: Pulmonary effort is normal.      Breath sounds: Normal breath sounds.      Comments: Lungs essentially CTA b/l  Abdominal:      General: There is no distension.      Palpations: Abdomen is soft. There is no mass.      Tenderness: There is no abdominal tenderness.   Musculoskeletal:      Right lower leg: No edema.      Left lower leg: No edema.   Skin:     Coloration: Skin is not jaundiced.      Findings: No rash.   Neurological:      General: No focal deficit present.      Mental Status: She is alert and oriented to person, place, and time.   Psychiatric:         Mood and Affect: Mood " normal.         Behavior: Behavior normal.         Thought Content: Thought content normal.         Judgment: Judgment normal.     Full range of motion with hand movement bilaterally    Assessment/Plan   Diagnoses and all orders for this visit:  Pain in both hands

## 2024-06-14 ENCOUNTER — APPOINTMENT (OUTPATIENT)
Dept: PRIMARY CARE | Facility: CLINIC | Age: 44
End: 2024-06-14
Payer: COMMERCIAL

## 2024-06-17 ENCOUNTER — APPOINTMENT (OUTPATIENT)
Dept: PRIMARY CARE | Facility: CLINIC | Age: 44
End: 2024-06-17
Payer: COMMERCIAL

## 2024-06-17 ENCOUNTER — TELEPHONE (OUTPATIENT)
Dept: HEMATOLOGY/ONCOLOGY | Facility: CLINIC | Age: 44
End: 2024-06-17
Payer: COMMERCIAL

## 2024-06-18 ENCOUNTER — LAB (OUTPATIENT)
Dept: LAB | Facility: LAB | Age: 44
End: 2024-06-18
Payer: COMMERCIAL

## 2024-06-18 ENCOUNTER — APPOINTMENT (OUTPATIENT)
Dept: LAB | Facility: LAB | Age: 44
End: 2024-06-18
Payer: COMMERCIAL

## 2024-06-18 DIAGNOSIS — C85.10 LOW GRADE B-CELL LYMPHOMA (MULTI): ICD-10-CM

## 2024-06-18 LAB
ALBUMIN SERPL-MCNC: 4 G/DL (ref 3.5–5)
ALP BLD-CCNC: 114 U/L (ref 35–125)
ALT SERPL-CCNC: 14 U/L (ref 5–40)
ANION GAP SERPL CALC-SCNC: 12 MMOL/L
AST SERPL-CCNC: 13 U/L (ref 5–40)
BASOPHILS # BLD AUTO: 0.13 X10*3/UL (ref 0–0.1)
BASOPHILS NFR BLD AUTO: 1.2 %
BILIRUB SERPL-MCNC: 0.3 MG/DL (ref 0.1–1.2)
BUN SERPL-MCNC: 14 MG/DL (ref 8–25)
CALCIUM SERPL-MCNC: 9.5 MG/DL (ref 8.5–10.4)
CHLORIDE SERPL-SCNC: 100 MMOL/L (ref 97–107)
CO2 SERPL-SCNC: 25 MMOL/L (ref 24–31)
CREAT SERPL-MCNC: 0.9 MG/DL (ref 0.4–1.6)
EGFRCR SERPLBLD CKD-EPI 2021: 82 ML/MIN/1.73M*2
EOSINOPHIL # BLD AUTO: 0.21 X10*3/UL (ref 0–0.7)
EOSINOPHIL NFR BLD AUTO: 1.9 %
ERYTHROCYTE [DISTWIDTH] IN BLOOD BY AUTOMATED COUNT: 13.8 % (ref 11.5–14.5)
FERRITIN SERPL-MCNC: 188 NG/ML (ref 13–150)
FOLATE SERPL-MCNC: 7.4 NG/ML (ref 4.2–19.9)
GLUCOSE SERPL-MCNC: 88 MG/DL (ref 65–99)
HCT VFR BLD AUTO: 43.8 % (ref 36–46)
HGB BLD-MCNC: 14.4 G/DL (ref 12–16)
IMM GRANULOCYTES # BLD AUTO: 0.13 X10*3/UL (ref 0–0.7)
IMM GRANULOCYTES NFR BLD AUTO: 1.2 % (ref 0–0.9)
IRON SATN MFR SERPL: 20 % (ref 12–50)
IRON SERPL-MCNC: 51 UG/DL (ref 30–160)
LDH SERPL-CCNC: 175 U/L (ref 91–227)
LYMPHOCYTES # BLD AUTO: 1.97 X10*3/UL (ref 1.2–4.8)
LYMPHOCYTES NFR BLD AUTO: 17.6 %
MCH RBC QN AUTO: 28.9 PG (ref 26–34)
MCHC RBC AUTO-ENTMCNC: 32.9 G/DL (ref 32–36)
MCV RBC AUTO: 88 FL (ref 80–100)
MONOCYTES # BLD AUTO: 0.96 X10*3/UL (ref 0.1–1)
MONOCYTES NFR BLD AUTO: 8.6 %
NEUTROPHILS # BLD AUTO: 7.77 X10*3/UL (ref 1.2–7.7)
NEUTROPHILS NFR BLD AUTO: 69.5 %
NRBC BLD-RTO: 0 /100 WBCS (ref 0–0)
PLATELET # BLD AUTO: 372 X10*3/UL (ref 150–450)
POTASSIUM SERPL-SCNC: 4 MMOL/L (ref 3.4–5.1)
PROT SERPL-MCNC: 6.9 G/DL (ref 5.9–7.9)
RBC # BLD AUTO: 4.99 X10*6/UL (ref 4–5.2)
SODIUM SERPL-SCNC: 137 MMOL/L (ref 133–145)
TIBC SERPL-MCNC: 254 UG/DL (ref 228–428)
UIBC SERPL-MCNC: 203 UG/DL (ref 110–370)
VIT B12 SERPL-MCNC: 752 PG/ML (ref 211–946)
WBC # BLD AUTO: 11.2 X10*3/UL (ref 4.4–11.3)

## 2024-06-18 PROCEDURE — 82728 ASSAY OF FERRITIN: CPT

## 2024-06-18 PROCEDURE — 85025 COMPLETE CBC W/AUTO DIFF WBC: CPT

## 2024-06-18 PROCEDURE — 36415 COLL VENOUS BLD VENIPUNCTURE: CPT

## 2024-06-18 PROCEDURE — 83540 ASSAY OF IRON: CPT

## 2024-06-18 PROCEDURE — 82607 VITAMIN B-12: CPT

## 2024-06-18 PROCEDURE — 82746 ASSAY OF FOLIC ACID SERUM: CPT

## 2024-06-18 PROCEDURE — 83615 LACTATE (LD) (LDH) ENZYME: CPT

## 2024-06-18 PROCEDURE — 83550 IRON BINDING TEST: CPT

## 2024-06-18 PROCEDURE — 80053 COMPREHEN METABOLIC PANEL: CPT

## 2024-06-24 ENCOUNTER — APPOINTMENT (OUTPATIENT)
Dept: HEMATOLOGY/ONCOLOGY | Facility: CLINIC | Age: 44
End: 2024-06-24
Payer: COMMERCIAL

## 2024-06-24 ENCOUNTER — OFFICE VISIT (OUTPATIENT)
Dept: HEMATOLOGY/ONCOLOGY | Facility: CLINIC | Age: 44
End: 2024-06-24
Payer: COMMERCIAL

## 2024-06-24 VITALS
DIASTOLIC BLOOD PRESSURE: 93 MMHG | HEART RATE: 101 BPM | WEIGHT: 223.22 LBS | HEIGHT: 63 IN | BODY MASS INDEX: 39.55 KG/M2 | OXYGEN SATURATION: 99 % | SYSTOLIC BLOOD PRESSURE: 133 MMHG | RESPIRATION RATE: 16 BRPM | TEMPERATURE: 96.5 F

## 2024-06-24 DIAGNOSIS — D50.0 IRON DEFICIENCY ANEMIA DUE TO CHRONIC BLOOD LOSS: ICD-10-CM

## 2024-06-24 DIAGNOSIS — C85.10 LOW GRADE B-CELL LYMPHOMA (MULTI): Primary | ICD-10-CM

## 2024-06-24 PROCEDURE — 99214 OFFICE O/P EST MOD 30 MIN: CPT | Performed by: INTERNAL MEDICINE

## 2024-06-24 ASSESSMENT — PAIN SCALES - GENERAL: PAINLEVEL: 0-NO PAIN

## 2024-06-24 NOTE — PROGRESS NOTES
Patient ID: Maciej Noyola is a 43 y.o. female.    Subjective:  No B symptoms of fever, drenching night sweats, weight loss.  No new skin lesions.  No breast lesions. But c/o itching and groin pain the same symptoms when she had breast MZL.    Heme/Onc History:  43-year-old woman with underlying depression and morbid obesity who was noted by her partner to have a lump in the lumbar spine region in early 2019. She thought  initially that this was a muscle issue, thus she saw a chiropractor. This worsened, thus she underwent further evaluation. The initial ultrasound on February 6, 2019 showed a 4 x 2.5 cm lesion. Subsequent MRI spine showed a lobulated appearing cystic  focus measuring up to 3.6 cm. No spinal cord/vertebral body involvement. On February 28, patient underwent an excision of the lumbar mass. 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 Flow cytometry showed a monoclonal B-cell  population without CD5, CD10 or CD11 expression, consistent with B-cell lymphoma. 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 Patient underwent a PET/CT on 03/25/2019, which showed diffuse hypermetabolic marrow  activity involvingvertebral bodies throughout the spine and pelvic bone. She then subsequentlyunderwent radiation therapy by Dr. Crowe to the soft tissue of the back. She received 26 Gy total, completing onAugust 16, 2019. She has since remained on observation.     The patient complained  of right breast pruritus in April 2020. MRI breast was ordered, which showed a 1.3cmoval mass at the 3 o'clock position. Biopsy on May 8, 2020 revealed a low-grade marginal B cell lymphoma. She thenunderwent a PET/CT on June 17, 2020. This showed moderate  activity with an SUV of 2.4 corresponding to an ill-definedmass 2.6 cm in the posterior lateral aspect at the 9 o'clock axis of the right breast. She received four doses of rituximab in June/July 2020.     Family  "History:     Family History   Problem Relation Name Age of Onset    No Known Problems Son         Past Medical History  Past Medical History:   Diagnosis Date    Lymphoma (Multi)         Surgical history:   Past Surgical History:   Procedure Laterality Date    BI STEREOTACTIC GUIDED BREAST RIGHT LOCALIZATION AND BIOPSY Right 12/17/2019    BI STEREOTACTIC GUIDED BREAST LOCALIZATION AND BIOPSY RIGHT [facility]    BI US GUIDED BREAST LOCALIZATION AND BIOPSY RIGHT Right 05/29/2020    BI US GUIDED BREAST LOCALIZATION AND BIOPSY RIGHT [facility]    BREAST BIOPSY        reports that she has never smoked. She has been exposed to tobacco smoke. She has never used smokeless tobacco.    Review Of Systems:  As per in HPI, otherwise all other 12 point of ROS are negative.    Physical Exam:  Resp 16   Ht 1.606 m (5' 3.23\")   Wt 101 kg (223 lb 3.5 oz)   BMI 39.26 kg/m²   BSA: 2.12 meters squared  General: awake/alert/oriented x3, no distress, alert and cooperative  Head: Short hair fully covering scalp. Symmetric facial expressions  Eyes: PERRL, EOMI, clear sclera, eyebrows present.  Ears/Nose/Mouth/Throat:  Oral mucous membranes moist. No oral ulcers. No palpable pre/post-auricular lymph nodes  Neck: No palpable cervical chain lymph nodes  Respiratory: unlabored breathing on room air, good chest expansion, thorax symmetric  Cardio: Regular rate and rhythm, normal S1 and S2, radial pulses symmetric  GI: Nondistended, soft, non-tender abdomen  Musculoskeletal: Normal muscle bulk and tone, ROM intact, no joint swelling.  Rises from chair and walks unassisted.  Extremities: No ankle swelling, no arm or leg wounds  Neuro: Alert, cognition intact, speech normal. Facial expressions symmetric.  No motor deficits noted. Sensation intact to touch and hot/cold.   Able to stand from seated position unassisted and walks around the room unassisted.  Psychological: Appropriate mood and behavior.  Skin: Warm and dry, no " lesions, no rashes    Results:  Diagnostic Results   Lab Results   Component Value Date    WBC 11.2 06/18/2024    HGB 14.4 06/18/2024    HCT 43.8 06/18/2024    MCV 88 06/18/2024     06/18/2024     Lab Results   Component Value Date    CALCIUM 9.5 06/18/2024     06/18/2024    K 4.0 06/18/2024    CO2 25 06/18/2024     06/18/2024    BUN 14 06/18/2024    CREATININE 0.90 06/18/2024    ALT 14 06/18/2024    AST 13 06/18/2024     Lab on 06/18/2024   Component Date Value Ref Range Status    LDH 06/18/2024 175  91 - 227 U/L Final    Hemolysis    Glucose 06/18/2024 88  65 - 99 mg/dL Final    Sodium 06/18/2024 137  133 - 145 mmol/L Final    Potassium 06/18/2024 4.0  3.4 - 5.1 mmol/L Final    Chloride 06/18/2024 100  97 - 107 mmol/L Final    Bicarbonate 06/18/2024 25  24 - 31 mmol/L Final    Urea Nitrogen 06/18/2024 14  8 - 25 mg/dL Final    Creatinine 06/18/2024 0.90  0.40 - 1.60 mg/dL Final    eGFR 06/18/2024 82  >60 mL/min/1.73m*2 Final    Calculations of estimated GFR are performed using the 2021 CKD-EPI Study Refit equation without the race variable for the IDMS-Traceable creatinine methods.  https://jasn.asnjournals.org/content/early/2021/09/22/ASN.1545746462    Calcium 06/18/2024 9.5  8.5 - 10.4 mg/dL Final    Albumin 06/18/2024 4.0  3.5 - 5.0 g/dL Final    Alkaline Phosphatase 06/18/2024 114  35 - 125 U/L Final    Total Protein 06/18/2024 6.9  5.9 - 7.9 g/dL Final    AST 06/18/2024 13  5 - 40 U/L Final    Bilirubin, Total 06/18/2024 0.3  0.1 - 1.2 mg/dL Final    ALT 06/18/2024 14  5 - 40 U/L Final    Anion Gap 06/18/2024 12  <=19 mmol/L Final    Folate, Serum 06/18/2024 7.4  4.2 - 19.9 ng/mL Final    WBC 06/18/2024 11.2  4.4 - 11.3 x10*3/uL Final    nRBC 06/18/2024 0.0  0.0 - 0.0 /100 WBCs Final    RBC 06/18/2024 4.99  4.00 - 5.20 x10*6/uL Final    Hemoglobin 06/18/2024 14.4  12.0 - 16.0 g/dL Final    Hematocrit 06/18/2024 43.8  36.0 - 46.0 % Final    MCV 06/18/2024 88  80 - 100 fL Final    MCH  06/18/2024 28.9  26.0 - 34.0 pg Final    MCHC 06/18/2024 32.9  32.0 - 36.0 g/dL Final    RDW 06/18/2024 13.8  11.5 - 14.5 % Final    Platelets 06/18/2024 372  150 - 450 x10*3/uL Final    Neutrophils % 06/18/2024 69.5  40.0 - 80.0 % Final    Immature Granulocytes %, Automated 06/18/2024 1.2 (H)  0.0 - 0.9 % Final    Immature Granulocyte Count (IG) includes promyelocytes, myelocytes and metamyelocytes but does not include bands. Percent differential counts (%) should be interpreted in the context of the absolute cell counts (cells/UL).    Lymphocytes % 06/18/2024 17.6  13.0 - 44.0 % Final    Monocytes % 06/18/2024 8.6  2.0 - 10.0 % Final    Eosinophils % 06/18/2024 1.9  0.0 - 6.0 % Final    Basophils % 06/18/2024 1.2  0.0 - 2.0 % Final    Neutrophils Absolute 06/18/2024 7.77 (H)  1.20 - 7.70 x10*3/uL Final    Percent differential counts (%) should be interpreted in the context of the absolute cell counts (cells/uL).    Immature Granulocytes Absolute, Au* 06/18/2024 0.13  0.00 - 0.70 x10*3/uL Final    Lymphocytes Absolute 06/18/2024 1.97  1.20 - 4.80 x10*3/uL Final    Monocytes Absolute 06/18/2024 0.96  0.10 - 1.00 x10*3/uL Final    Eosinophils Absolute 06/18/2024 0.21  0.00 - 0.70 x10*3/uL Final    Basophils Absolute 06/18/2024 0.13 (H)  0.00 - 0.10 x10*3/uL Final    Iron 06/18/2024 51  30 - 160 ug/dL Final    UIBC 06/18/2024 203  110 - 370 ug/dL Final    TIBC 06/18/2024 254  228 - 428 ug/dL Final    % Saturation 06/18/2024 20  12 - 50 % Final    Vitamin B12 06/18/2024 752  211 - 946 pg/mL Final    Ferritin 06/18/2024 188 (H)  13 - 150 ng/mL Final          Current Outpatient Medications:     cyanocobalamin (Vitamin B-12) 500 mcg tablet, Take 1 tablet (500 mcg) by mouth once daily., Disp: , Rfl:     ergocalciferol (Vitamin D-2) 1.25 MG (57168 UT) capsule, TAKE 1 CAPSULE ONCE WEEKLY, Disp: 12 capsule, Rfl: 1    ferrous fumarate-vitamin C (Hector-Sequeles 65-25), Take 1 tablet by mouth once daily with breakfast. Do not  crush, chew, or split., Disp: , Rfl:     hydrOXYzine HCL (Atarax) 10 mg tablet, Take 2.5 tablets (25 mg) by mouth every 6 hours if needed for itching for up to 10 days. (Patient not taking: Reported on 6/12/2024), Disp: 30 tablet, Rfl: 2    ketoconazole (NIZOral) 2 % cream, Apply topically 2 times a day. To itchy skin underneath breasts for 2-4 weeks, repeat as needed, Disp: 60 g, Rfl: 11    melatonin-5-HTP-theanine-lemon 1.5-12.5-50-0.5 mg tablet,chewable, Chew., Disp: , Rfl:     omega 3-dha-epa-fish oil 1,000 mg (250 mg-750 mg)/5 mL liquid, Take 1 capsule by mouth once daily., Disp: , Rfl:     omeprazole (PriLOSEC) 40 mg DR capsule, Take 1 capsule (40 mg) by mouth once daily in the morning. Take before meals. Do not crush or chew., Disp: 90 capsule, Rfl: 3    Qelbree 100 mg capsule,extended release 24hr, Take 2 capsules (200 mg) by mouth once daily., Disp: , Rfl:     Christiana's wort 300 mg capsule, Take 1 capsule by mouth once daily., Disp: , Rfl:     triamcinolone (Kenalog) 0.1 % cream, Apply topically 2 times a day. To all itchy areas on body for 2 weeks then decrease to remaining itchy spots another 2 weeks; repeat for spots up to 2 weeks at a time as needed for flares, Disp: 454 g, Rfl: 1     Radiology:    Pathology:    Assessment/Plan:    1- MZL:    43-year-old woman with marginal zone lymphoma of the lumbar mass Dx in 2019 status post radiation therapy and then was found to have a breast MZL in 2020 s/p Rituximab weekly x 4.    PET CT 10/2023: CR. Repeat annually    Labs: WNL, increased ferritin and neutrophils due to recent use of prednisone which she took for itching. She had itching with her breast MZL. She has groin pain. Worries about MZL in groin.     RTC in 4 weeks with repeat PET CT.    2- breast cancer screening: history of MZL in breast and right axilla. She has large but dense breasts so we will alternate MMG with Breast MRI annually. Breast MRI is due in 01/2025.     Diagnoses and all orders for  this visit:  Low grade B-cell lymphoma (Multi)  -     Lactate Dehydrogenase; Future  -     Comprehensive Metabolic Panel; Future  -     Folate; Future  -     CBC and Auto Differential; Future  -     Iron and TIBC; Future  -     Vitamin B12; Future  -     Ferritin; Future  -     Clinic Appointment Request Follow Up; [facility]  Iron deficiency anemia due to chronic blood loss  -     Clinic Appointment Request  -     Clinic Appointment Request Follow Up; [facility]       Performance Status: Asymptomatic    I spent more than 30 minutes for the patient today, including face-to-face conversation, pre-visit preparation, post-visit orders, and others.   Meliton De La Rosa MD

## 2024-06-24 NOTE — PROGRESS NOTES
Patient here alone for New MD visit with Dr. Coelho, seen for anemia.    Reconciled and reviewed medication and allergy list with patient.    Per MD, patient to have PET scan done for restaging of Lymphoma and follow up at the end of July with Dr. Coelho.     Reminded patient to check her My Chart for any updates to scheduling and to review medication list against what  has at home.  Also reviewed patient can obtain lab results on My Chart which will be reviewed at follow up visits.    No barriers to education noted, pt. Agreed to plan and verbalized understanding using teach back method

## 2024-07-08 ENCOUNTER — OFFICE VISIT (OUTPATIENT)
Dept: DERMATOLOGY | Facility: HOSPITAL | Age: 44
End: 2024-07-08
Payer: COMMERCIAL

## 2024-07-08 DIAGNOSIS — L20.89 OTHER ATOPIC DERMATITIS: ICD-10-CM

## 2024-07-08 DIAGNOSIS — L30.4 INTERTRIGO: Primary | ICD-10-CM

## 2024-07-08 PROCEDURE — 99214 OFFICE O/P EST MOD 30 MIN: CPT | Performed by: DERMATOLOGY

## 2024-07-08 PROCEDURE — 1036F TOBACCO NON-USER: CPT | Performed by: DERMATOLOGY

## 2024-07-08 RX ORDER — CLOTRIMAZOLE AND BETAMETHASONE DIPROPIONATE 10; .64 MG/G; MG/G
CREAM TOPICAL
Qty: 45 G | Refills: 3 | Status: SHIPPED | OUTPATIENT
Start: 2024-07-08

## 2024-07-08 ASSESSMENT — DERMATOLOGY PATIENT ASSESSMENT
ARE YOU TRYING TO GET PREGNANT: NO
ARE YOU ON BIRTH CONTROL: NO
DO YOU USE A TANNING BED: NO
ARE YOU AN ORGAN TRANSPLANT RECIPIENT: NO
HAVE YOU HAD OR DO YOU HAVE VASCULAR DISEASE: NO
DO YOU HAVE IRREGULAR MENSTRUAL CYCLES: NO
DO YOU USE SUNSCREEN: OCCASIONALLY
HAVE YOU HAD OR DO YOU HAVE A STAPH INFECTION: NO
DO YOU HAVE ANY NEW OR CHANGING LESIONS: NO

## 2024-07-08 ASSESSMENT — ITCH NUMERIC RATING SCALE: HOW SEVERE IS YOUR ITCHING?: 5

## 2024-07-08 ASSESSMENT — PATIENT GLOBAL ASSESSMENT (PGA): PATIENT GLOBAL ASSESSMENT: PATIENT GLOBAL ASSESSMENT:  1 - CLEAR

## 2024-07-08 ASSESSMENT — DERMATOLOGY QUALITY OF LIFE (QOL) ASSESSMENT
RATE HOW BOTHERED YOU ARE BY EFFECTS OF YOUR SKIN PROBLEMS ON YOUR ACTIVITIES (EG, GOING OUT, ACCOMPLISHING WHAT YOU WANT, WORK ACTIVITIES OR YOUR RELATIONSHIPS WITH OTHERS): 0 - NEVER BOTHERED
DATE THE QUALITY-OF-LIFE ASSESSMENT WAS COMPLETED: 67029
RATE HOW EMOTIONALLY BOTHERED YOU ARE BY YOUR SKIN PROBLEM (FOR EXAMPLE, WORRY, EMBARRASSMENT, FRUSTRATION): 0 - NEVER BOTHERED
RATE HOW BOTHERED YOU ARE BY SYMPTOMS OF YOUR SKIN PROBLEM (EG, ITCHING, STINGING BURNING, HURTING OR SKIN IRRITATION): 0 - NEVER BOTHERED
ARE THERE EXCLUSIONS OR EXCEPTIONS FOR THE QUALITY OF LIFE ASSESSMENT: NO

## 2024-07-08 NOTE — PROGRESS NOTES
Subjective     Maciej Noyola is a 44 y.o. female who presents for the following: Dermatitis (Follow up).     Last derm visit 4/29/24 for atopic dermatitis. Her MZL has presented as pruritus in the past, but at last visit the exam was most consistent with eczema. Rx triamcinolone 0.1% cream    History of marginal zone B cell lymphoma on the lower back s/p excision 2/2019. PET/CT 3/2019 showed diffuse activity thought to be infecitous mononucleosis, repeat PET/CT 6/2019 was negative. Radiation completed 8/2019. MRI breast revealed a 1.3 cm mass which was confirmed as MZL by biopsy s/p 4 week course of rituximab. PET/CT 11/2020 stable. Follows with oncology every 6 months     Since last visit it has gone up and done. Resolved on antecubital fossa but still prsent around hips and lower abdomen. In past she has had rash on upper inner thighs and had been given antibiotics and creams from prior derms. She uses one of the creams she had at home and it is now gone. She used that same cream on abdomen and it seems to be helping. Clotrimazole-betamethasone    Review of Systems:  No other skin or systemic complaints other than what is documented elsewhere in the note.    The following portions of the chart were reviewed this encounter and updated as appropriate:   Tobacco  Allergies  Meds  Problems  Med Hx  Surg Hx         Skin Cancer History  No skin cancer on file.      Specialty Problems    None       Objective   Well appearing patient in no apparent distress; mood and affect are within normal limits.    A focused skin examination was performed. All findings within normal limits unless otherwise noted below.    Assessment/Plan   1. Intertrigo  Left Abdomen (side) - Lower, Right Abdomen (side) - Lower  Clear on exam today    -Discussed nature of condition  - intermittent flares under breasts and upper inner thighs  - OK to use clotrimazole-betamethasone when ketoconazole cream not working    -This is chronic  condition and likely to recur  -Discussed condition is worsened by skin-to-skin contact and moisture. Recommend to keep the area as dry as possible. This may be accomplished by the use of cold air with a hair dryer on cold shot setting to blow cool air on the area after shower to ensure the skin is fully dry prior to cream application.  -Consider the use of InterDry fabric to absorb moisture where areas of skin contact skin.  -Recommend rx Hydrocortisone 2.5% cream to the affected area twice daily until the condition is improved and healing, then discontinue  -Recommend rx Ketoconazole 2% cream to the affected area twice daily until the condition is improved, and then apply Ketoconazole 2% cream nightly at bedtime to fully dry skin for maintenance to prevent recurrence.  -Use of zinc oxide paste to fully dry skin may also be helpful to prevent recurrence as this provides a barrier between areas where skin contacts skin. May use Desitin, purple or blue tube.  -Discussed with/information given to the patient on the risks, benefits and alternatives of the usage of topical corticosteroids, including but not limited to: atrophy (thinning of the skin), striae (stretch marks), telangiectasia (blood vessel growth), and dyspigmentation (discoloration of the skin).  -Recommend to limit long-term use of topical corticosteroids to less than 14 days per month to reduce risk of side effects.      Related Medications  clotrimazole-betamethasone (Lotrisone) cream  Apply to affected areas twice daily when active as needed. Use less than 14 days per month.    2. Other atopic dermatitis  Left Abdomen (side) - Lower, Right Abdomen (side) - Lower  Very thin scaly symmetric ill-defined plaques on bilateral lower abdomen    Body surface area:  <1%       -Discussed the nature of condition  - friction and/or component of elastic band may be irritating  - try to wear loose clothing as much as possible  - try to tuck cotton shirt into pants  as much as possible    - no evidence of cutaneous lymphoma today. She does have a scan coming up based on abnormal bloodwork    - may have component of candidal intertrigo as well. OK to use clotrimazole-betamethasone when triamcinolone is not working    -Recommend to utilize gentle skin care habits with gentle cleansers, limiting water exposure, and using liberal emollients. Recommend to use liberal emollients twice daily, one time applied immediately after shower while skin is still slightly damp. Use emollients to all areas of the body that may be affected and use whether the rash is active or not. Use prescription medications before applying emollients.   -Discussed with/information to the patient on the risks, benefits and alternatives of the usage of topical corticosteroids, including but not limited to: atrophy (thinning of the skin), striae (stretch marks), telangiectasia (blood vessel growth), and dyspigmentation (discoloration of the skin).  -Recommend to limit long-term use of topical corticosteroids to less than 14 days per month to reduce risk of side effects.  -Recommend:    Related Procedures  Follow Up In Dermatology - Established Patient  Follow Up In Dermatology - Established Patient    Related Medications  triamcinolone (Kenalog) 0.1 % cream  Apply topically 2 times a day. To all itchy areas on body for 2 weeks then decrease to remaining itchy spots another 2 weeks; repeat for spots up to 2 weeks at a time as needed for flares        Follow up 6 months Full Skin Exam

## 2024-07-10 ENCOUNTER — HOSPITAL ENCOUNTER (OUTPATIENT)
Dept: RADIOLOGY | Facility: CLINIC | Age: 44
Discharge: HOME | End: 2024-07-10
Payer: COMMERCIAL

## 2024-07-10 DIAGNOSIS — C85.10 LOW GRADE B-CELL LYMPHOMA (MULTI): ICD-10-CM

## 2024-07-10 PROCEDURE — 3430000001 HC RX 343 DIAGNOSTIC RADIOPHARMACEUTICALS: Performed by: INTERNAL MEDICINE

## 2024-07-10 PROCEDURE — 78815 PET IMAGE W/CT SKULL-THIGH: CPT

## 2024-07-10 PROCEDURE — A9552 F18 FDG: HCPCS | Performed by: INTERNAL MEDICINE

## 2024-07-10 PROCEDURE — 78815 PET IMAGE W/CT SKULL-THIGH: CPT | Performed by: RADIOLOGY

## 2024-07-10 RX ORDER — FLUDEOXYGLUCOSE F 18 200 MCI/ML
11.6 INJECTION, SOLUTION INTRAVENOUS
Status: COMPLETED | OUTPATIENT
Start: 2024-07-10 | End: 2024-07-10

## 2024-07-12 ASSESSMENT — ENCOUNTER SYMPTOMS
DIARRHEA: 0
ENDOCRINE NEGATIVE: 1
DIFFICULTY URINATING: 0
FEVER: 0
DIZZINESS: 0
SHORTNESS OF BREATH: 0
NAUSEA: 0

## 2024-07-15 ENCOUNTER — APPOINTMENT (OUTPATIENT)
Dept: PRIMARY CARE | Facility: CLINIC | Age: 44
End: 2024-07-15
Payer: COMMERCIAL

## 2024-07-15 RX ORDER — LISDEXAMFETAMINE DIMESYLATE 30 MG/1
30 CAPSULE ORAL EVERY MORNING
COMMUNITY
Start: 2023-05-02

## 2024-07-15 RX ORDER — CYCLOBENZAPRINE HCL 5 MG
5 TABLET ORAL NIGHTLY PRN
COMMUNITY

## 2024-07-15 RX ORDER — VILAZODONE HYDROCHLORIDE 20 MG/1
20 TABLET ORAL DAILY
COMMUNITY

## 2024-07-15 RX ORDER — DEXLANSOPRAZOLE 60 MG/1
60 CAPSULE, DELAYED RELEASE ORAL DAILY
COMMUNITY
Start: 2023-05-22

## 2024-07-29 ENCOUNTER — OFFICE VISIT (OUTPATIENT)
Dept: HEMATOLOGY/ONCOLOGY | Facility: CLINIC | Age: 44
End: 2024-07-29
Payer: COMMERCIAL

## 2024-07-29 VITALS
RESPIRATION RATE: 16 BRPM | OXYGEN SATURATION: 97 % | DIASTOLIC BLOOD PRESSURE: 87 MMHG | BODY MASS INDEX: 38.83 KG/M2 | TEMPERATURE: 96.2 F | HEART RATE: 91 BPM | WEIGHT: 220.79 LBS | SYSTOLIC BLOOD PRESSURE: 124 MMHG

## 2024-07-29 DIAGNOSIS — L29.9 ITCHING: Primary | ICD-10-CM

## 2024-07-29 DIAGNOSIS — C85.10 LOW GRADE B-CELL LYMPHOMA (MULTI): ICD-10-CM

## 2024-07-29 PROCEDURE — 99215 OFFICE O/P EST HI 40 MIN: CPT | Performed by: INTERNAL MEDICINE

## 2024-07-29 RX ORDER — CAMPHOR AND MENTHOL 5; 5 MG/ML; MG/ML
LOTION TOPICAL AS NEEDED
Qty: 222 ML | Refills: 3 | Status: SHIPPED | OUTPATIENT
Start: 2024-07-29 | End: 2025-07-29

## 2024-07-29 ASSESSMENT — PAIN SCALES - GENERAL: PAINLEVEL: 0-NO PAIN

## 2024-07-29 NOTE — PROGRESS NOTES
Pt seen in office today for a follow up visit with Dr. Meliton De La Rosa  for management of her low grade B- cell lymphoma/ anemia.  She reports constant itchiness. She has been to a dermatologist who has prescribed topical cream which patient states barely works.  Dr. Coelho is aware.    Medications, pharmacy preference and allergies were reviewed with patient and updated in the medical record by MD.    Per orders, a PET scan was done on 7/10/24 and results are to be reviewed by MD with patient during visit today.  She will return to clinic in 6 months for a FUV with labs and a breast MRI prior to visit. Dr. Coelho has also sent in an RX for   Sarna lotion for the itching.    Our contact information was given to patient and they were encouraged to contact us with any questions or concerns.     Patient verbalized understanding and agreement regarding discussed information via verbal feedback. Pt escorted to scheduling.

## 2024-07-29 NOTE — PROGRESS NOTES
Patient ID: Maciej Noyola is a 44 y.o. female.    Subjective:  No B symptoms of fever, drenching night sweats, weight loss.  No new skin lesions.  No breast lesions. But c/o itching and groin pain the same symptoms when she had breast MZL.    Heme/Onc History:  43-year-old woman with underlying depression and morbid obesity who was noted by her partner to have a lump in the lumbar spine region in early 2019. She thought  initially that this was a muscle issue, thus she saw a chiropractor. This worsened, thus she underwent further evaluation. The initial ultrasound on February 6, 2019 showed a 4 x 2.5 cm lesion. Subsequent MRI spine showed a lobulated appearing cystic  focus measuring up to 3.6 cm. No spinal cord/vertebral body involvement. On February 28, patient underwent an excision of the lumbar mass. 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 Flow cytometry showed a monoclonal B-cell  population without CD5, CD10 or CD11 expression, consistent with B-cell lymphoma. 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 Patient underwent a PET/CT on 03/25/2019, which showed diffuse hypermetabolic marrow  activity involvingvertebral bodies throughout the spine and pelvic bone. She then subsequentlyunderwent radiation therapy by Dr. Crowe to the soft tissue of the back. She received 26 Gy total, completing onAugust 16, 2019. She has since remained on observation.     The patient complained  of right breast pruritus in April 2020. MRI breast was ordered, which showed a 1.3cmoval mass at the 3 o'clock position. Biopsy on May 8, 2020 revealed a low-grade marginal B cell lymphoma. She thenunderwent a PET/CT on June 17, 2020. This showed moderate  activity with an SUV of 2.4 corresponding to an ill-definedmass 2.6 cm in the posterior lateral aspect at the 9 o'clock axis of the right breast. She received four doses of rituximab in June/July 2020.     Family  History:     Family History   Problem Relation Name Age of Onset    No Known Problems Son         Past Medical History  Past Medical History:   Diagnosis Date    Lymphoma (Multi)         Surgical history:   Past Surgical History:   Procedure Laterality Date    BI STEREOTACTIC GUIDED BREAST RIGHT LOCALIZATION AND BIOPSY Right 12/17/2019    BI STEREOTACTIC GUIDED BREAST LOCALIZATION AND BIOPSY RIGHT [facility]    BI US GUIDED BREAST LOCALIZATION AND BIOPSY RIGHT Right 05/29/2020    BI US GUIDED BREAST LOCALIZATION AND BIOPSY RIGHT [facility]    BREAST BIOPSY        reports that she has never smoked. She has been exposed to tobacco smoke. She has never used smokeless tobacco.    Review Of Systems:  As per in HPI, otherwise all other 12 point of ROS are negative.    Physical Exam:  /87 (BP Location: Right arm, Patient Position: Sitting, BP Cuff Size: Adult long)   Pulse 91   Temp 35.7 °C (96.2 °F) (Temporal)   Resp 16   Wt 100 kg (220 lb 12.7 oz)   SpO2 97%   BMI 38.83 kg/m²   BSA: 2.11 meters squared  General: awake/alert/oriented x3, no distress, alert and cooperative  Head: Short hair fully covering scalp. Symmetric facial expressions  Eyes: PERRL, EOMI, clear sclera, eyebrows present.  Ears/Nose/Mouth/Throat:  Oral mucous membranes moist. No oral ulcers. No palpable pre/post-auricular lymph nodes  Neck: No palpable cervical chain lymph nodes  Respiratory: unlabored breathing on room air, good chest expansion, thorax symmetric  Cardio: Regular rate and rhythm, normal S1 and S2, radial pulses symmetric  GI: Nondistended, soft, non-tender abdomen  Musculoskeletal: Normal muscle bulk and tone, ROM intact, no joint swelling.  Rises from chair and walks unassisted.  Extremities: No ankle swelling, no arm or leg wounds  Neuro: Alert, cognition intact, speech normal. Facial expressions symmetric.  No motor deficits noted. Sensation intact to touch and hot/cold.   Able to stand from seated  position unassisted and walks around the room unassisted.  Psychological: Appropriate mood and behavior.  Skin: Warm and dry, no lesions, no rashes    Results:  Diagnostic Results   Lab Results   Component Value Date    WBC 11.2 06/18/2024    HGB 14.4 06/18/2024    HCT 43.8 06/18/2024    MCV 88 06/18/2024     06/18/2024     Lab Results   Component Value Date    CALCIUM 9.5 06/18/2024     06/18/2024    K 4.0 06/18/2024    CO2 25 06/18/2024     06/18/2024    BUN 14 06/18/2024    CREATININE 0.90 06/18/2024    ALT 14 06/18/2024    AST 13 06/18/2024     Lab on 06/18/2024   Component Date Value Ref Range Status    LDH 06/18/2024 175  91 - 227 U/L Final    Hemolysis    Glucose 06/18/2024 88  65 - 99 mg/dL Final    Sodium 06/18/2024 137  133 - 145 mmol/L Final    Potassium 06/18/2024 4.0  3.4 - 5.1 mmol/L Final    Chloride 06/18/2024 100  97 - 107 mmol/L Final    Bicarbonate 06/18/2024 25  24 - 31 mmol/L Final    Urea Nitrogen 06/18/2024 14  8 - 25 mg/dL Final    Creatinine 06/18/2024 0.90  0.40 - 1.60 mg/dL Final    eGFR 06/18/2024 82  >60 mL/min/1.73m*2 Final    Calculations of estimated GFR are performed using the 2021 CKD-EPI Study Refit equation without the race variable for the IDMS-Traceable creatinine methods.  https://jasn.asnjournals.org/content/early/2021/09/22/ASN.3357280215    Calcium 06/18/2024 9.5  8.5 - 10.4 mg/dL Final    Albumin 06/18/2024 4.0  3.5 - 5.0 g/dL Final    Alkaline Phosphatase 06/18/2024 114  35 - 125 U/L Final    Total Protein 06/18/2024 6.9  5.9 - 7.9 g/dL Final    AST 06/18/2024 13  5 - 40 U/L Final    Bilirubin, Total 06/18/2024 0.3  0.1 - 1.2 mg/dL Final    ALT 06/18/2024 14  5 - 40 U/L Final    Anion Gap 06/18/2024 12  <=19 mmol/L Final    Folate, Serum 06/18/2024 7.4  4.2 - 19.9 ng/mL Final    WBC 06/18/2024 11.2  4.4 - 11.3 x10*3/uL Final    nRBC 06/18/2024 0.0  0.0 - 0.0 /100 WBCs Final    RBC 06/18/2024 4.99  4.00 - 5.20 x10*6/uL Final    Hemoglobin 06/18/2024 14.4   12.0 - 16.0 g/dL Final    Hematocrit 06/18/2024 43.8  36.0 - 46.0 % Final    MCV 06/18/2024 88  80 - 100 fL Final    MCH 06/18/2024 28.9  26.0 - 34.0 pg Final    MCHC 06/18/2024 32.9  32.0 - 36.0 g/dL Final    RDW 06/18/2024 13.8  11.5 - 14.5 % Final    Platelets 06/18/2024 372  150 - 450 x10*3/uL Final    Neutrophils % 06/18/2024 69.5  40.0 - 80.0 % Final    Immature Granulocytes %, Automated 06/18/2024 1.2 (H)  0.0 - 0.9 % Final    Immature Granulocyte Count (IG) includes promyelocytes, myelocytes and metamyelocytes but does not include bands. Percent differential counts (%) should be interpreted in the context of the absolute cell counts (cells/UL).    Lymphocytes % 06/18/2024 17.6  13.0 - 44.0 % Final    Monocytes % 06/18/2024 8.6  2.0 - 10.0 % Final    Eosinophils % 06/18/2024 1.9  0.0 - 6.0 % Final    Basophils % 06/18/2024 1.2  0.0 - 2.0 % Final    Neutrophils Absolute 06/18/2024 7.77 (H)  1.20 - 7.70 x10*3/uL Final    Percent differential counts (%) should be interpreted in the context of the absolute cell counts (cells/uL).    Immature Granulocytes Absolute, Au* 06/18/2024 0.13  0.00 - 0.70 x10*3/uL Final    Lymphocytes Absolute 06/18/2024 1.97  1.20 - 4.80 x10*3/uL Final    Monocytes Absolute 06/18/2024 0.96  0.10 - 1.00 x10*3/uL Final    Eosinophils Absolute 06/18/2024 0.21  0.00 - 0.70 x10*3/uL Final    Basophils Absolute 06/18/2024 0.13 (H)  0.00 - 0.10 x10*3/uL Final    Iron 06/18/2024 51  30 - 160 ug/dL Final    UIBC 06/18/2024 203  110 - 370 ug/dL Final    TIBC 06/18/2024 254  228 - 428 ug/dL Final    % Saturation 06/18/2024 20  12 - 50 % Final    Vitamin B12 06/18/2024 752  211 - 946 pg/mL Final    Ferritin 06/18/2024 188 (H)  13 - 150 ng/mL Final          Current Outpatient Medications:     clotrimazole-betamethasone (Lotrisone) cream, Apply to affected areas twice daily when active as needed. Use less than 14 days per month., Disp: 45 g, Rfl: 3    cyanocobalamin (Vitamin B-12) 500 mcg tablet, Take  1 tablet (500 mcg) by mouth once daily., Disp: , Rfl:     cyclobenzaprine (Flexeril) 5 mg tablet, Take 1 tablet (5 mg) by mouth as needed at bedtime for muscle spasms., Disp: , Rfl:     dexlansoprazole (Dexilant) 60 mg DR capsule, Take 1 capsule (60 mg) by mouth once daily., Disp: , Rfl:     ergocalciferol (Vitamin D-2) 1.25 MG (24689 UT) capsule, TAKE 1 CAPSULE ONCE WEEKLY, Disp: 12 capsule, Rfl: 1    ferrous fumarate-vitamin C (Hector-Sequeles 65-25), Take 1 tablet by mouth once daily with breakfast. Do not crush, chew, or split., Disp: , Rfl:     hydrOXYzine HCL (Atarax) 10 mg tablet, Take 2.5 tablets (25 mg) by mouth every 6 hours if needed for itching for up to 10 days. (Patient not taking: Reported on 6/12/2024), Disp: 30 tablet, Rfl: 2    lisdexamfetamine (Vyvanse) 30 mg capsule, Take 1 capsule (30 mg) by mouth once daily in the morning., Disp: , Rfl:     melatonin-5-HTP-theanine-lemon 1.5-12.5-50-0.5 mg tablet,chewable, Chew., Disp: , Rfl:     omega 3-dha-epa-fish oil 1,000 mg (250 mg-750 mg)/5 mL liquid, Take 1 capsule by mouth once daily., Disp: , Rfl:     omeprazole (PriLOSEC) 40 mg DR capsule, Take 1 capsule (40 mg) by mouth once daily in the morning. Take before meals. Do not crush or chew., Disp: 90 capsule, Rfl: 3    Qelbree 100 mg capsule,extended release 24hr, Take 2 capsules (200 mg) by mouth once daily., Disp: , Rfl:     True's wort 300 mg capsule, Take 1 capsule by mouth once daily., Disp: , Rfl:     triamcinolone (Kenalog) 0.1 % cream, Apply topically 2 times a day. To all itchy areas on body for 2 weeks then decrease to remaining itchy spots another 2 weeks; repeat for spots up to 2 weeks at a time as needed for flares, Disp: 454 g, Rfl: 1    vilazodone (Viibryd) 20 mg tablet, Take 1 tablet (20 mg) by mouth once daily., Disp: , Rfl:      Radiology:    Pathology:    Assessment/Plan:    1- MZL:    43-year-old woman with marginal zone lymphoma of the lumbar mass Dx in 2019 status post radiation  therapy and then was found to have a breast MZL in 2020 s/p Rituximab weekly x 4.    PET CT 10/2023: CR.     PET CT 07/2024: CR. Repeat annually    Labs: WNL, increased ferritin and neutrophils due to recent use of prednisone which she took for itching.     2- Breast cancer screening: history of MZL in breast and right axilla. She has large but dense breasts so we will alternate MMG with Breast MRI annually. Breast MRI is due in 01/2025.    RTC in 01/25 with labs and MRI breast     Diagnoses and all orders for this visit:  Low grade B-cell lymphoma (Multi)  -     Clinic Appointment Request       Performance Status: Asymptomatic    I spent more than 30 minutes for the patient today, including face-to-face conversation, pre-visit preparation, post-visit orders, and others.   Meliton De La Rosa MD

## 2024-10-24 DIAGNOSIS — K21.9 GASTROESOPHAGEAL REFLUX DISEASE WITHOUT ESOPHAGITIS: ICD-10-CM

## 2024-10-31 RX ORDER — OMEPRAZOLE 40 MG/1
40 CAPSULE, DELAYED RELEASE ORAL
Qty: 90 CAPSULE | Refills: 3 | Status: SHIPPED | OUTPATIENT
Start: 2024-10-31

## 2024-11-08 DIAGNOSIS — E55.9 VITAMIN D DEFICIENCY: ICD-10-CM

## 2024-11-14 RX ORDER — ERGOCALCIFEROL 1.25 MG/1
1 CAPSULE ORAL
Qty: 12 CAPSULE | Refills: 3 | Status: SHIPPED | OUTPATIENT
Start: 2024-11-17

## 2024-12-11 DIAGNOSIS — Z12.31 ENCOUNTER FOR SCREENING MAMMOGRAM FOR BREAST CANCER: Primary | ICD-10-CM

## 2024-12-11 DIAGNOSIS — J02.9 SORE THROAT: ICD-10-CM

## 2024-12-11 RX ORDER — CEFDINIR 300 MG/1
300 CAPSULE ORAL 2 TIMES DAILY
Qty: 20 CAPSULE | Refills: 0 | Status: SHIPPED | OUTPATIENT
Start: 2024-12-11 | End: 2024-12-21

## 2024-12-29 DIAGNOSIS — J40 BRONCHITIS: Primary | ICD-10-CM

## 2024-12-29 RX ORDER — DOXYCYCLINE 100 MG/1
100 CAPSULE ORAL 2 TIMES DAILY
Qty: 20 CAPSULE | Refills: 0 | Status: SHIPPED | OUTPATIENT
Start: 2024-12-29 | End: 2025-01-08

## 2025-01-06 ENCOUNTER — APPOINTMENT (OUTPATIENT)
Dept: DERMATOLOGY | Facility: HOSPITAL | Age: 45
End: 2025-01-06
Payer: COMMERCIAL

## 2025-01-27 ENCOUNTER — APPOINTMENT (OUTPATIENT)
Dept: HEMATOLOGY/ONCOLOGY | Facility: CLINIC | Age: 45
End: 2025-01-27
Payer: COMMERCIAL

## 2025-01-29 ENCOUNTER — HOSPITAL ENCOUNTER (OUTPATIENT)
Dept: RADIOLOGY | Facility: CLINIC | Age: 45
Discharge: HOME | End: 2025-01-29
Payer: COMMERCIAL

## 2025-01-29 DIAGNOSIS — C85.10 LOW GRADE B-CELL LYMPHOMA (MULTI): ICD-10-CM

## 2025-01-29 PROCEDURE — 2550000001 HC RX 255 CONTRASTS: Performed by: INTERNAL MEDICINE

## 2025-01-29 PROCEDURE — A9575 INJ GADOTERATE MEGLUMI 0.1ML: HCPCS | Performed by: INTERNAL MEDICINE

## 2025-01-29 PROCEDURE — 77049 MRI BREAST C-+ W/CAD BI: CPT

## 2025-01-29 PROCEDURE — 77049 MRI BREAST C-+ W/CAD BI: CPT | Performed by: RADIOLOGY

## 2025-01-29 RX ORDER — GADOTERATE MEGLUMINE 376.9 MG/ML
20 INJECTION INTRAVENOUS
Status: COMPLETED | OUTPATIENT
Start: 2025-01-29 | End: 2025-01-29

## 2025-01-29 RX ADMIN — GADOTERATE MEGLUMINE 20 ML: 376.9 INJECTION INTRAVENOUS at 10:38

## 2025-02-06 ENCOUNTER — LAB (OUTPATIENT)
Dept: LAB | Facility: CLINIC | Age: 45
End: 2025-02-06
Payer: COMMERCIAL

## 2025-02-06 DIAGNOSIS — L29.9 ITCHING: ICD-10-CM

## 2025-02-06 DIAGNOSIS — C85.10 LOW GRADE B-CELL LYMPHOMA (MULTI): ICD-10-CM

## 2025-02-06 LAB
ALBUMIN SERPL BCP-MCNC: 4.3 G/DL (ref 3.4–5)
ALP SERPL-CCNC: 94 U/L (ref 33–110)
ALT SERPL W P-5'-P-CCNC: 13 U/L (ref 7–45)
ANION GAP SERPL CALC-SCNC: 13 MMOL/L (ref 10–20)
AST SERPL W P-5'-P-CCNC: 16 U/L (ref 9–39)
BASOPHILS # BLD AUTO: 0.13 X10*3/UL (ref 0–0.1)
BASOPHILS NFR BLD AUTO: 1.4 %
BILIRUB SERPL-MCNC: 0.5 MG/DL (ref 0–1.2)
BUN SERPL-MCNC: 11 MG/DL (ref 6–23)
CALCIUM SERPL-MCNC: 9.1 MG/DL (ref 8.6–10.3)
CHLORIDE SERPL-SCNC: 101 MMOL/L (ref 98–107)
CO2 SERPL-SCNC: 26 MMOL/L (ref 21–32)
CREAT SERPL-MCNC: 0.87 MG/DL (ref 0.5–1.05)
EGFRCR SERPLBLD CKD-EPI 2021: 85 ML/MIN/1.73M*2
EOSINOPHIL # BLD AUTO: 0.14 X10*3/UL (ref 0–0.7)
EOSINOPHIL NFR BLD AUTO: 1.5 %
ERYTHROCYTE [DISTWIDTH] IN BLOOD BY AUTOMATED COUNT: 13.1 % (ref 11.5–14.5)
FERRITIN SERPL-MCNC: 178 NG/ML (ref 8–150)
GLUCOSE SERPL-MCNC: 84 MG/DL (ref 74–99)
HCT VFR BLD AUTO: 43.8 % (ref 36–46)
HGB BLD-MCNC: 14.5 G/DL (ref 12–16)
IMM GRANULOCYTES # BLD AUTO: 0.03 X10*3/UL (ref 0–0.7)
IMM GRANULOCYTES NFR BLD AUTO: 0.3 % (ref 0–0.9)
IRON SATN MFR SERPL: 13 % (ref 25–45)
IRON SERPL-MCNC: 42 UG/DL (ref 35–150)
LYMPHOCYTES # BLD AUTO: 1.61 X10*3/UL (ref 1.2–4.8)
LYMPHOCYTES NFR BLD AUTO: 17.7 %
MCH RBC QN AUTO: 28.5 PG (ref 26–34)
MCHC RBC AUTO-ENTMCNC: 33.1 G/DL (ref 32–36)
MCV RBC AUTO: 86 FL (ref 80–100)
MONOCYTES # BLD AUTO: 0.85 X10*3/UL (ref 0.1–1)
MONOCYTES NFR BLD AUTO: 9.3 %
NEUTROPHILS # BLD AUTO: 6.36 X10*3/UL (ref 1.2–7.7)
NEUTROPHILS NFR BLD AUTO: 69.8 %
NRBC BLD-RTO: 0 /100 WBCS (ref 0–0)
PLATELET # BLD AUTO: 369 X10*3/UL (ref 150–450)
POTASSIUM SERPL-SCNC: 3.5 MMOL/L (ref 3.5–5.3)
PROT SERPL-MCNC: 7.8 G/DL (ref 6.4–8.2)
RBC # BLD AUTO: 5.08 X10*6/UL (ref 4–5.2)
SODIUM SERPL-SCNC: 136 MMOL/L (ref 136–145)
TIBC SERPL-MCNC: 319 UG/DL (ref 240–445)
UIBC SERPL-MCNC: 277 UG/DL (ref 110–370)
VIT B12 SERPL-MCNC: 512 PG/ML (ref 211–911)
WBC # BLD AUTO: 9.1 X10*3/UL (ref 4.4–11.3)

## 2025-02-06 PROCEDURE — 80053 COMPREHEN METABOLIC PANEL: CPT

## 2025-02-06 PROCEDURE — 83540 ASSAY OF IRON: CPT

## 2025-02-06 PROCEDURE — 85025 COMPLETE CBC W/AUTO DIFF WBC: CPT

## 2025-02-06 PROCEDURE — 36415 COLL VENOUS BLD VENIPUNCTURE: CPT

## 2025-02-06 PROCEDURE — 82607 VITAMIN B-12: CPT

## 2025-02-06 PROCEDURE — 82728 ASSAY OF FERRITIN: CPT

## 2025-02-14 ENCOUNTER — OFFICE VISIT (OUTPATIENT)
Dept: HEMATOLOGY/ONCOLOGY | Facility: CLINIC | Age: 45
End: 2025-02-14
Payer: COMMERCIAL

## 2025-02-14 VITALS
OXYGEN SATURATION: 98 % | SYSTOLIC BLOOD PRESSURE: 133 MMHG | DIASTOLIC BLOOD PRESSURE: 94 MMHG | BODY MASS INDEX: 37.09 KG/M2 | TEMPERATURE: 97.7 F | WEIGHT: 214.62 LBS | RESPIRATION RATE: 18 BRPM | HEART RATE: 79 BPM

## 2025-02-14 DIAGNOSIS — C85.10 LOW GRADE B-CELL LYMPHOMA (MULTI): ICD-10-CM

## 2025-02-14 DIAGNOSIS — L29.9 ITCHING: ICD-10-CM

## 2025-02-14 PROCEDURE — 99215 OFFICE O/P EST HI 40 MIN: CPT | Performed by: INTERNAL MEDICINE

## 2025-02-14 ASSESSMENT — PAIN SCALES - GENERAL: PAINLEVEL_OUTOF10: 0-NO PAIN

## 2025-02-14 NOTE — PROGRESS NOTES
Patient ID: Maciej Noyola is a 44 y.o. female.    Subjective:  No B symptoms of fever, drenching night sweats, weight loss.  No new skin lesions.  No breast lesions. But c/o itching and groin pain the same symptoms when she had breast MZL.    Heme/Onc History:  43-year-old woman with underlying depression and morbid obesity who was noted by her partner to have a lump in the lumbar spine region in early 2019. She thought  initially that this was a muscle issue, thus she saw a chiropractor. This worsened, thus she underwent further evaluation. The initial ultrasound on February 6, 2019 showed a 4 x 2.5 cm lesion. Subsequent MRI spine showed a lobulated appearing cystic  focus measuring up to 3.6 cm. No spinal cord/vertebral body involvement. On February 28, patient underwent an excision of the lumbar mass. 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 Flow cytometry showed a monoclonal B-cell  population without CD5, CD10 or CD11 expression, consistent with B-cell lymphoma. 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 Patient underwent a PET/CT on 03/25/2019, which showed diffuse hypermetabolic marrow  activity involvingvertebral bodies throughout the spine and pelvic bone. She then subsequentlyunderwent radiation therapy by Dr. Crowe to the soft tissue of the back. She received 26 Gy total, completing onAugust 16, 2019. She has since remained on observation.     The patient complained  of right breast pruritus in April 2020. MRI breast was ordered, which showed a 1.3cmoval mass at the 3 o'clock position. Biopsy on May 8, 2020 revealed a low-grade marginal B cell lymphoma. She thenunderwent a PET/CT on June 17, 2020. This showed moderate  activity with an SUV of 2.4 corresponding to an ill-definedmass 2.6 cm in the posterior lateral aspect at the 9 o'clock axis of the right breast. She received four doses of rituximab in June/July 2020.     Family  History:     Family History   Problem Relation Name Age of Onset    No Known Problems Son         Past Medical History  Past Medical History:   Diagnosis Date    Lymphoma (Multi)         Surgical history:   Past Surgical History:   Procedure Laterality Date    BI STEREOTACTIC GUIDED BREAST RIGHT LOCALIZATION AND BIOPSY Right 12/17/2019    BI STEREOTACTIC GUIDED BREAST LOCALIZATION AND BIOPSY RIGHT [facility]    BI US GUIDED BREAST LOCALIZATION AND BIOPSY RIGHT Right 05/29/2020    BI US GUIDED BREAST LOCALIZATION AND BIOPSY RIGHT [facility]    BREAST BIOPSY        reports that she has never smoked. She has been exposed to tobacco smoke. She has never used smokeless tobacco.    Review Of Systems:  As per in HPI, otherwise all other 12 point of ROS are negative.    Physical Exam:  BP (!) 133/94 (BP Location: Left arm, Patient Position: Sitting, BP Cuff Size: Adult long)   Pulse 79   Temp 36.5 °C (97.7 °F) (Temporal)   Resp 18   Wt 97.3 kg (214 lb 9.9 oz)   SpO2 98%   BMI 37.09 kg/m²   BSA: 2.09 meters squared  General: awake/alert/oriented x3, no distress, alert and cooperative  Head: Short hair fully covering scalp. Symmetric facial expressions  Eyes: PERRL, EOMI, clear sclera, eyebrows present.  Ears/Nose/Mouth/Throat:  Oral mucous membranes moist. No oral ulcers. No palpable pre/post-auricular lymph nodes  Neck: No palpable cervical chain lymph nodes  Respiratory: unlabored breathing on room air, good chest expansion, thorax symmetric  Cardio: Regular rate and rhythm, normal S1 and S2, radial pulses symmetric  GI: Nondistended, soft, non-tender abdomen  Musculoskeletal: Normal muscle bulk and tone, ROM intact, no joint swelling.  Rises from chair and walks unassisted.  Extremities: No ankle swelling, no arm or leg wounds  Neuro: Alert, cognition intact, speech normal. Facial expressions symmetric.  No motor deficits noted. Sensation intact to touch and hot/cold.   Able to stand from seated  position unassisted and walks around the room unassisted.  Psychological: Appropriate mood and behavior.  Skin: Warm and dry, no lesions, no rashes    Results:  Diagnostic Results   Lab Results   Component Value Date    WBC 9.1 02/06/2025    HGB 14.5 02/06/2025    HCT 43.8 02/06/2025    MCV 86 02/06/2025     02/06/2025     Lab Results   Component Value Date    CALCIUM 9.1 02/06/2025     02/06/2025    K 3.5 02/06/2025    CO2 26 02/06/2025     02/06/2025    BUN 11 02/06/2025    CREATININE 0.87 02/06/2025    ALT 13 02/06/2025    AST 16 02/06/2025     Lab on 02/06/2025   Component Date Value Ref Range Status    Ferritin 02/06/2025 178 (H)  8 - 150 ng/mL Final    Vitamin B12 02/06/2025 512  211 - 911 pg/mL Final    Iron 02/06/2025 42  35 - 150 ug/dL Final    UIBC 02/06/2025 277  110 - 370 ug/dL Final    TIBC 02/06/2025 319  240 - 445 ug/dL Final    % Saturation 02/06/2025 13 (L)  25 - 45 % Final    Glucose 02/06/2025 84  74 - 99 mg/dL Final    Sodium 02/06/2025 136  136 - 145 mmol/L Final    Potassium 02/06/2025 3.5  3.5 - 5.3 mmol/L Final    Chloride 02/06/2025 101  98 - 107 mmol/L Final    Bicarbonate 02/06/2025 26  21 - 32 mmol/L Final    Anion Gap 02/06/2025 13  10 - 20 mmol/L Final    Urea Nitrogen 02/06/2025 11  6 - 23 mg/dL Final    Creatinine 02/06/2025 0.87  0.50 - 1.05 mg/dL Final    eGFR 02/06/2025 85  >60 mL/min/1.73m*2 Final    Calculations of estimated GFR are performed using the 2021 CKD-EPI Study Refit equation without the race variable for the IDMS-Traceable creatinine methods.  https://jasn.asnjournals.org/content/early/2021/09/22/ASN.8685253235    Calcium 02/06/2025 9.1  8.6 - 10.3 mg/dL Final    Albumin 02/06/2025 4.3  3.4 - 5.0 g/dL Final    Alkaline Phosphatase 02/06/2025 94  33 - 110 U/L Final    Total Protein 02/06/2025 7.8  6.4 - 8.2 g/dL Final    AST 02/06/2025 16  9 - 39 U/L Final    Bilirubin, Total 02/06/2025 0.5  0.0 - 1.2 mg/dL Final    ALT 02/06/2025 13  7 - 45 U/L Final     Patients treated with Sulfasalazine may generate falsely decreased results for ALT.    WBC 02/06/2025 9.1  4.4 - 11.3 x10*3/uL Final    nRBC 02/06/2025 0.0  0.0 - 0.0 /100 WBCs Final    RBC 02/06/2025 5.08  4.00 - 5.20 x10*6/uL Final    Hemoglobin 02/06/2025 14.5  12.0 - 16.0 g/dL Final    Hematocrit 02/06/2025 43.8  36.0 - 46.0 % Final    MCV 02/06/2025 86  80 - 100 fL Final    MCH 02/06/2025 28.5  26.0 - 34.0 pg Final    MCHC 02/06/2025 33.1  32.0 - 36.0 g/dL Final    RDW 02/06/2025 13.1  11.5 - 14.5 % Final    Platelets 02/06/2025 369  150 - 450 x10*3/uL Final    Neutrophils % 02/06/2025 69.8  40.0 - 80.0 % Final    Immature Granulocytes %, Automated 02/06/2025 0.3  0.0 - 0.9 % Final    Immature Granulocyte Count (IG) includes promyelocytes, myelocytes and metamyelocytes but does not include bands. Percent differential counts (%) should be interpreted in the context of the absolute cell counts (cells/UL).    Lymphocytes % 02/06/2025 17.7  13.0 - 44.0 % Final    Monocytes % 02/06/2025 9.3  2.0 - 10.0 % Final    Eosinophils % 02/06/2025 1.5  0.0 - 6.0 % Final    Basophils % 02/06/2025 1.4  0.0 - 2.0 % Final    Neutrophils Absolute 02/06/2025 6.36  1.20 - 7.70 x10*3/uL Final    Percent differential counts (%) should be interpreted in the context of the absolute cell counts (cells/uL).    Immature Granulocytes Absolute, Au* 02/06/2025 0.03  0.00 - 0.70 x10*3/uL Final    Lymphocytes Absolute 02/06/2025 1.61  1.20 - 4.80 x10*3/uL Final    Monocytes Absolute 02/06/2025 0.85  0.10 - 1.00 x10*3/uL Final    Eosinophils Absolute 02/06/2025 0.14  0.00 - 0.70 x10*3/uL Final    Basophils Absolute 02/06/2025 0.13 (H)  0.00 - 0.10 x10*3/uL Final          Current Outpatient Medications:     camphor-menthoL (Sarna OriginaL) lotion, Apply topically if needed for itching., Disp: 222 mL, Rfl: 3    clotrimazole-betamethasone (Lotrisone) cream, Apply to affected areas twice daily when active as needed. Use less than 14 days per  month., Disp: 45 g, Rfl: 3    cyanocobalamin (Vitamin B-12) 500 mcg tablet, Take 1 tablet (500 mcg) by mouth once daily., Disp: , Rfl:     ergocalciferol (Vitamin D-2) 1.25 MG (33365 UT) capsule, TAKE 1 CAPSULE ONCE WEEKLY, Disp: 12 capsule, Rfl: 3    omega 3-dha-epa-fish oil 1,000 mg (250 mg-750 mg)/5 mL liquid, Take 1 capsule by mouth once daily., Disp: , Rfl:     omeprazole (PriLOSEC) 40 mg DR capsule, TAKE 1 CAPSULE DAILY IN THE MORNING BEFORE A MEAL (DO NOT CRUSH OR CHEW), Disp: 90 capsule, Rfl: 3    Qelbree 100 mg capsule,extended release 24hr, Take 2 capsules (200 mg) by mouth once daily., Disp: , Rfl:     True's wort 300 mg capsule, Take 1 capsule by mouth once daily., Disp: , Rfl:     triamcinolone (Kenalog) 0.1 % cream, Apply topically 2 times a day. To all itchy areas on body for 2 weeks then decrease to remaining itchy spots another 2 weeks; repeat for spots up to 2 weeks at a time as needed for flares, Disp: 454 g, Rfl: 1     Radiology:    Pathology:    Assessment/Plan:    1- MZL:    43-year-old woman with marginal zone lymphoma of the lumbar mass Dx in 2019 status post radiation therapy and then was found to have a breast MZL in 2020 s/p Rituximab weekly x 4.    PET CT 10/2023: CR.     PET CT 07/2024: CR. Do imaging only if it is clinically indicated.    Labs: WNL, increased ferritin and neutrophils due to recent use of prednisone which she took for itching.     2- Breast cancer screening: history of MZL in breast and right axilla. She has large but dense breasts so we will continue Breast MRI annually. Breast MRI in 01/2025: NIKKI    3- Low iron sat: without anemia and normal ferritin indicates iron def. Po iron 3 times weekly for 6 months is recommended    4- borderline low folate: she is vegetarian. Advised to take folate every day. She is also taking vit b12 every day.    RTC in 6 months with labs      Diagnoses and all orders for this visit:  Low grade B-cell lymphoma (Multi)  -     Clinic  Appointment Request  Itching  -     Clinic Appointment Request       Performance Status: Asymptomatic    I spent more than 30 minutes for the patient today, including face-to-face conversation, pre-visit preparation, post-visit orders, and others.   Meliton De La Rosa MD

## 2025-02-14 NOTE — PROGRESS NOTES
Pt seen in office today for a follow up visit with Dr. Meliton De La Rosa for management of her marginal zone lymphoma. She is  without complaints today and denies pain.     Medications, pharmacy preference and allergies were reviewed with patient and updated in the medical record by MD.     Per orders, labs were obtained on 2/6/25 and an MRI of breasts were completed on 1/29/25. Results are to be reviewed in office today by MD with patient.She will RTC in 6 months with labs prior to her visit.    Our contact information was given to patient and they were encouraged to contact us with any questions or concerns.     Patient verbalized understanding and agreement regarding discussed information via verbal feedback. Pt escorted to scheduling.

## 2025-02-19 ENCOUNTER — OFFICE VISIT (OUTPATIENT)
Dept: URGENT CARE | Age: 45
End: 2025-02-19
Payer: COMMERCIAL

## 2025-02-19 VITALS
TEMPERATURE: 98.1 F | HEIGHT: 64 IN | SYSTOLIC BLOOD PRESSURE: 149 MMHG | RESPIRATION RATE: 18 BRPM | HEART RATE: 89 BPM | DIASTOLIC BLOOD PRESSURE: 93 MMHG | OXYGEN SATURATION: 99 % | BODY MASS INDEX: 37.56 KG/M2 | WEIGHT: 220 LBS

## 2025-02-19 DIAGNOSIS — R35.0 FREQUENCY OF URINATION: ICD-10-CM

## 2025-02-19 DIAGNOSIS — R39.9 UTI SYMPTOMS: Primary | ICD-10-CM

## 2025-02-19 DIAGNOSIS — R30.0 DYSURIA: ICD-10-CM

## 2025-02-19 LAB
POC APPEARANCE, URINE: ABNORMAL
POC BILIRUBIN, URINE: NEGATIVE
POC BLOOD, URINE: NEGATIVE
POC COLOR, URINE: YELLOW
POC GLUCOSE, URINE: NEGATIVE MG/DL
POC KETONES, URINE: NEGATIVE MG/DL
POC LEUKOCYTES, URINE: NEGATIVE
POC NITRITE,URINE: NEGATIVE
POC PH, URINE: 6 PH
POC PROTEIN, URINE: ABNORMAL MG/DL
POC SPECIFIC GRAVITY, URINE: >=1.03
POC UROBILINOGEN, URINE: 0.2 EU/DL

## 2025-02-19 RX ORDER — NITROFURANTOIN 25; 75 MG/1; MG/1
100 CAPSULE ORAL 2 TIMES DAILY
Qty: 14 CAPSULE | Refills: 0 | Status: SHIPPED | OUTPATIENT
Start: 2025-02-19 | End: 2025-02-26

## 2025-02-19 ASSESSMENT — ENCOUNTER SYMPTOMS
FREQUENCY: 1
DYSURIA: 1

## 2025-02-19 NOTE — PATIENT INSTRUCTIONS
Macrobid-1 tablet 2 times a day for 7 days.  [facility] send out- if ABT needs to be changed, will call   Small amount of protein found in urine-please call and set up a follow-up appoint with your primary care doctor to evaluate further.  If worsening symptoms, please go to ER    Please follow up with your primary provider within one week if symptoms do not improve.  You may schedule an appointment online at [email or web address] or call [phone]. Go to the Emergency Department if symptoms significantly worsen or if you develop chest pain or shortness of breath.

## 2025-02-19 NOTE — PROGRESS NOTES
"Subjective   Patient ID: Maciej Noyola is a 44 y.o. female. They present today with a chief complaint of Urinary Problem (Urgency and pressure x 1 day.).    History of Present Illness  Maciej Noyola is a 44 y.o. female who presents to the clinic for 1 day of urinary pressure, urgency, frequency, burning. Pt denies any chest pain, sob, N/V at this time in clinic.             Past Medical History  Allergies as of 02/19/2025    (No Known Allergies)       (Not in a hospital admission)       Past Medical History:   Diagnosis Date    Lymphoma        Past Surgical History:   Procedure Laterality Date    BI STEREOTACTIC GUIDED BREAST RIGHT LOCALIZATION AND BIOPSY Right 12/17/2019    BI STEREOTACTIC GUIDED BREAST LOCALIZATION AND BIOPSY RIGHT [facility]    BI US GUIDED BREAST LOCALIZATION AND BIOPSY RIGHT Right 05/29/2020    BI US GUIDED BREAST LOCALIZATION AND BIOPSY RIGHT [facility]    BREAST BIOPSY          reports that she has never smoked. She has been exposed to tobacco smoke. She has never used smokeless tobacco. She reports that she does not drink alcohol and does not use drugs.    Review of Systems  Review of Systems   Genitourinary:  Positive for dysuria, frequency and urgency.   All other systems reviewed and are negative.                                 Objective    Vitals:    02/19/25 1632   BP: (!) 149/93   BP Location: Left arm   Patient Position: Sitting   BP Cuff Size: Large adult   Pulse: 89   Resp: 18   Temp: 36.7 °C (98.1 °F)   TempSrc: Oral   SpO2: 99%   Weight: 99.8 kg (220 lb)   Height: 1.626 m (5' 4\")     No LMP recorded. Patient is perimenopausal.    Physical Exam  Constitutional:       Appearance: Normal appearance.   Abdominal:      General: Abdomen is flat. There is no distension.      Palpations: Abdomen is soft.      Tenderness: There is no abdominal tenderness. There is no right CVA tenderness, left CVA tenderness, guarding or rebound.   Neurological:      General: No " focal deficit present.      Mental Status: She is alert and oriented to person, place, and time. Mental status is at baseline.   Psychiatric:         Mood and Affect: Mood normal.         Behavior: Behavior normal.         Procedures    Point of Care Test & Imaging Results from this visit  Results for orders placed or performed in visit on 02/19/25   POCT UA Automated manually resulted   Result Value Ref Range    POC Color, Urine Yellow Straw, Yellow, Light-Yellow    POC Appearance, Urine Cloudy (A) Clear    POC Glucose, Urine NEGATIVE NEGATIVE mg/dl    POC Bilirubin, Urine NEGATIVE NEGATIVE    POC Ketones, Urine NEGATIVE NEGATIVE mg/dl    POC Specific Gravity, Urine >=1.030 1.005 - 1.035    POC Blood, Urine NEGATIVE NEGATIVE    POC PH, Urine 6.0 No Reference Range Established PH    POC Protein, Urine TRACE (A) NEGATIVE mg/dl    POC Urobilinogen, Urine 0.2 0.2, 1.0 EU/DL    Poc Nitrite, Urine NEGATIVE NEGATIVE    POC Leukocytes, Urine NEGATIVE NEGATIVE      No results found.    Diagnostic study results (if any) were reviewed by TONI Fonseca-CNP.    Assessment/Plan   Allergies, medications, history, and pertinent labs/EKGs/Imaging reviewed by TONI Fonseca-CNP.     Medical Decision Making  History and examination are suggestive of UTI despite negative urinalysis results. No evidence of pyelonephritis or sepsis.  Will treat based upon history with pending culture results. Advised to return to clinic or present to ER if symptoms change or worsen otherwise follow up with pcp. patient verbalized understanding and agrees with plan.    -Small amount of protein noted on UA.  Advised patient to follow-up with her primary care doctor for further evaluation.  Patient verbalized understanding.    Orders and Diagnoses  Diagnoses and all orders for this visit:  UTI symptoms  -     Urine Culture  Frequency of urination  -     POCT UA Automated manually resulted  Dysuria  -     nitrofurantoin,  macrocrystal-monohydrate, (Macrobid) 100 mg capsule; Take 1 capsule (100 mg) by mouth 2 times a day for 7 days.      Medical Admin Record      Patient disposition: Home    Electronically signed by Robson Brush, APRN-CNP  4:56 PM

## 2025-02-20 ENCOUNTER — HOSPITAL ENCOUNTER (EMERGENCY)
Facility: HOSPITAL | Age: 45
Discharge: HOME | End: 2025-02-20
Attending: STUDENT IN AN ORGANIZED HEALTH CARE EDUCATION/TRAINING PROGRAM
Payer: COMMERCIAL

## 2025-02-20 ENCOUNTER — APPOINTMENT (OUTPATIENT)
Dept: CARDIOLOGY | Facility: HOSPITAL | Age: 45
End: 2025-02-20
Payer: COMMERCIAL

## 2025-02-20 ENCOUNTER — APPOINTMENT (OUTPATIENT)
Dept: RADIOLOGY | Facility: HOSPITAL | Age: 45
End: 2025-02-20
Payer: COMMERCIAL

## 2025-02-20 VITALS
DIASTOLIC BLOOD PRESSURE: 99 MMHG | RESPIRATION RATE: 18 BRPM | BODY MASS INDEX: 37.56 KG/M2 | HEART RATE: 81 BPM | SYSTOLIC BLOOD PRESSURE: 140 MMHG | HEIGHT: 64 IN | OXYGEN SATURATION: 97 % | WEIGHT: 220 LBS | TEMPERATURE: 97.7 F

## 2025-02-20 DIAGNOSIS — Z71.1 PHYSICALLY WELL BUT WORRIED: ICD-10-CM

## 2025-02-20 DIAGNOSIS — R07.9 ACUTE CHEST PAIN: Primary | ICD-10-CM

## 2025-02-20 LAB
ATRIAL RATE: 81 BPM
CARDIAC TROPONIN I PNL SERPL HS: <3 NG/L (ref 0–13)
D DIMER PPP FEU-MCNC: 0.33 MG/L FEU (ref 0.19–0.5)
P AXIS: 70 DEGREES
P OFFSET: 192 MS
P ONSET: 141 MS
PR INTERVAL: 148 MS
Q ONSET: 215 MS
QRS COUNT: 14 BEATS
QRS DURATION: 94 MS
QT INTERVAL: 358 MS
QTC CALCULATION(BAZETT): 415 MS
QTC FREDERICIA: 395 MS
R AXIS: 107 DEGREES
T AXIS: 66 DEGREES
T OFFSET: 394 MS
VENTRICULAR RATE: 81 BPM

## 2025-02-20 PROCEDURE — 71046 X-RAY EXAM CHEST 2 VIEWS: CPT

## 2025-02-20 PROCEDURE — 93005 ELECTROCARDIOGRAM TRACING: CPT

## 2025-02-20 PROCEDURE — 71046 X-RAY EXAM CHEST 2 VIEWS: CPT | Performed by: RADIOLOGY

## 2025-02-20 PROCEDURE — 99285 EMERGENCY DEPT VISIT HI MDM: CPT | Mod: 25 | Performed by: STUDENT IN AN ORGANIZED HEALTH CARE EDUCATION/TRAINING PROGRAM

## 2025-02-20 PROCEDURE — 2500000005 HC RX 250 GENERAL PHARMACY W/O HCPCS: Performed by: STUDENT IN AN ORGANIZED HEALTH CARE EDUCATION/TRAINING PROGRAM

## 2025-02-20 PROCEDURE — 85300 ANTITHROMBIN III ACTIVITY: CPT | Performed by: STUDENT IN AN ORGANIZED HEALTH CARE EDUCATION/TRAINING PROGRAM

## 2025-02-20 PROCEDURE — 36415 COLL VENOUS BLD VENIPUNCTURE: CPT | Performed by: STUDENT IN AN ORGANIZED HEALTH CARE EDUCATION/TRAINING PROGRAM

## 2025-02-20 PROCEDURE — 2500000001 HC RX 250 WO HCPCS SELF ADMINISTERED DRUGS (ALT 637 FOR MEDICARE OP): Performed by: STUDENT IN AN ORGANIZED HEALTH CARE EDUCATION/TRAINING PROGRAM

## 2025-02-20 PROCEDURE — 84484 ASSAY OF TROPONIN QUANT: CPT | Performed by: STUDENT IN AN ORGANIZED HEALTH CARE EDUCATION/TRAINING PROGRAM

## 2025-02-20 RX ORDER — FOLIC ACID 0.8 MG
0.8 TABLET ORAL 2 TIMES WEEKLY
COMMUNITY

## 2025-02-20 RX ORDER — FAMOTIDINE 20 MG/1
20 TABLET, FILM COATED ORAL ONCE
Status: COMPLETED | OUTPATIENT
Start: 2025-02-20 | End: 2025-02-20

## 2025-02-20 RX ORDER — ACETAMINOPHEN 500 MG
1000 TABLET ORAL ONCE
Status: COMPLETED | OUTPATIENT
Start: 2025-02-20 | End: 2025-02-20

## 2025-02-20 RX ORDER — ALUMINUM HYDROXIDE, MAGNESIUM HYDROXIDE, AND SIMETHICONE 1200; 120; 1200 MG/30ML; MG/30ML; MG/30ML
30 SUSPENSION ORAL ONCE
Status: DISCONTINUED | OUTPATIENT
Start: 2025-02-20 | End: 2025-02-20 | Stop reason: HOSPADM

## 2025-02-20 RX ORDER — LIDOCAINE HYDROCHLORIDE 20 MG/ML
15 SOLUTION OROPHARYNGEAL ONCE
Status: DISCONTINUED | OUTPATIENT
Start: 2025-02-20 | End: 2025-02-20 | Stop reason: HOSPADM

## 2025-02-20 RX ORDER — FERROUS GLUCONATE 325 MG
38 TABLET ORAL 2 TIMES WEEKLY
COMMUNITY

## 2025-02-20 RX ADMIN — ACETAMINOPHEN 1000 MG: 500 TABLET, FILM COATED ORAL at 15:08

## 2025-02-20 RX ADMIN — FAMOTIDINE 20 MG: 20 TABLET, FILM COATED ORAL at 15:08

## 2025-02-20 ASSESSMENT — LIFESTYLE VARIABLES
EVER HAD A DRINK FIRST THING IN THE MORNING TO STEADY YOUR NERVES TO GET RID OF A HANGOVER: NO
TOTAL SCORE: 0
EVER FELT BAD OR GUILTY ABOUT YOUR DRINKING: NO
HAVE PEOPLE ANNOYED YOU BY CRITICIZING YOUR DRINKING: NO
HAVE YOU EVER FELT YOU SHOULD CUT DOWN ON YOUR DRINKING: NO

## 2025-02-20 ASSESSMENT — COLUMBIA-SUICIDE SEVERITY RATING SCALE - C-SSRS
6. HAVE YOU EVER DONE ANYTHING, STARTED TO DO ANYTHING, OR PREPARED TO DO ANYTHING TO END YOUR LIFE?: NO
2. HAVE YOU ACTUALLY HAD ANY THOUGHTS OF KILLING YOURSELF?: NO
1. IN THE PAST MONTH, HAVE YOU WISHED YOU WERE DEAD OR WISHED YOU COULD GO TO SLEEP AND NOT WAKE UP?: NO

## 2025-02-20 ASSESSMENT — PAIN SCALES - GENERAL
PAINLEVEL_OUTOF10: 10 - WORST POSSIBLE PAIN
PAINLEVEL_OUTOF10: 3
PAINLEVEL_OUTOF10: 3

## 2025-02-20 ASSESSMENT — PAIN - FUNCTIONAL ASSESSMENT: PAIN_FUNCTIONAL_ASSESSMENT: 0-10

## 2025-02-20 NOTE — ED PROVIDER NOTES
HPI   Chief Complaint   Patient presents with    Chest Pain     Patient reports intermittent CP since midnight.  No cardiac history, minimal sob and nausea with the pain.       Patient presents to ED for left-sided chest and left lateral mammary pain that began around midnight and has become steady with intermittent brief durations of increased intensity that sharp/stabbing otherwise baseline dull ache.  Notes no recent falls or injuries.  Notes no ecchymosis.  Notes no radiation to her back.  No alleviating/aggravating factors.  Symptoms can occur at any time.  Notes no history of DVT/PE and notes no concerning history/Red flags for DVT/PE at this time but notes history of B-cell lymphoma without any active treatment at this time.  Denies any cardiac history.  Denies any significant cardiac history before the age of 50.  Notes some mild associated lightheadedness and diaphoresis with minimal nausea.  Denies any pleuritic chest pain.  Denies any feck symptoms to include fevers/chills, URI symptoms, or productive cough  Notes she is currently being treated for UTI symptoms without actual formal diagnosis with Macrobid.  Denies any significant substance use.  Denies any other significant systemic symptoms or complaints.              Patient History   Past Medical History:   Diagnosis Date    Lymphoma      Past Surgical History:   Procedure Laterality Date    BI STEREOTACTIC GUIDED BREAST RIGHT LOCALIZATION AND BIOPSY Right 12/17/2019    BI STEREOTACTIC GUIDED BREAST LOCALIZATION AND BIOPSY RIGHT [facility]    BI US GUIDED BREAST LOCALIZATION AND BIOPSY RIGHT Right 05/29/2020    BI US GUIDED BREAST LOCALIZATION AND BIOPSY RIGHT [facility]    BREAST BIOPSY       Family History   Problem Relation Name Age of Onset    No Known Problems Son       Social History     Tobacco Use    Smoking status: Never     Passive exposure: Past    Smokeless tobacco: Never   Vaping Use    Vaping status: Never Used  "  Substance Use Topics    Alcohol use: Never    Drug use: Never       Physical Exam   BP (!) 140/99   Pulse 81   Temp 36.5 °C (97.7 °F) (Temporal)   Resp 18   Ht 1.626 m (5' 4\")   Wt 99.8 kg (220 lb)   SpO2 97%   BMI 37.76 kg/m²       Physical Exam  Vitals and nursing note reviewed.   Constitutional:       General: She is not in acute distress.     Appearance: Normal appearance. She is not ill-appearing.   HENT:      Mouth/Throat:      Mouth: Mucous membranes are moist.      Pharynx: Oropharynx is clear.   Eyes:      Extraocular Movements: Extraocular movements intact.      Pupils: Pupils are equal, round, and reactive to light.   Cardiovascular:      Rate and Rhythm: Normal rate and regular rhythm.      Pulses: Normal pulses.           Radial pulses are 2+ on the right side and 2+ on the left side.      Heart sounds: Normal heart sounds. Heart sounds not distant. No murmur heard.     Comments: Equal and symmetrical distal pulses  Pulmonary:      Effort: Pulmonary effort is normal. No respiratory distress.      Breath sounds: Normal breath sounds. No decreased air movement. No decreased breath sounds.      Comments: Speaking in complete sentences, no conversational dyspnea, no requiring supplemental O2  Chest:      Chest wall: Tenderness present. No mass, deformity or crepitus.      Comments: Mild tenderness palpation of left sided chest/breast and left lateral breast/mammary region, no overlying ecchymosis or erythema, no evidence of vesicular rash, no appreciable crepitus/deformities or edema  Musculoskeletal:      Right lower leg: No edema.      Left lower leg: No edema.   Skin:     General: Skin is warm and dry.      Coloration: Skin is not ashen, cyanotic or pale.   Neurological:      General: No focal deficit present.      Mental Status: She is alert and oriented to person, place, and time.           ED Course & MDM   ED Course as of 02/21/25 1034   Thu Feb 20, 2025   3319 I personally reviewed and " interpreted the EKG @1434: NSR 81, normal axis/intervals and no appreciable ischemia, non-specific TW findings, and no prior EKG available for review [BC]   1456 VS notable for mild HTN in the setting of reported chest pain otherwise remaining VSS [BC]   1543 XR chest 2 views  IMPRESSION:  No acute cardiopulmonary process radiographically.     [BC]   1736 Troponin I, High Sensitivity  Undetectable in setting of chest pain, EKG unremarkable for ischemic changes, no concern for ACS/MI [BC]   1736 D-dimer, quantitative  Undetectable in the setting of chest pain, no immediate concern for DVT/PE [BC]   1800 On reassessment patient endorses mild improvement in symptoms post ED interventions, not endorsing any new or significant worsening symptoms.  Tolerated p.o. thousand mg episodes of nausea or emesis and no concern for clinical decompensation. [BC]      ED Course User Index  [BC] Anand Gottlieb MD         Diagnoses as of 02/21/25 1034   Acute chest pain   Physically well but worried                 No data recorded     Melquiades Coma Scale Score: 15 (02/20/25 1648 : Nyla Wilkins LPN)                           Medical Decision Making  Patient presented to the ED for left-sided chest pain since midnight with infrequent and brief durations of increasing severity/intensity with concerning PMHx of obesity, B-cell lymphoma, anxiety.  I personally reviewed and interpreted VS, labs, images, and EKG which are as stated above in the ED course.    Assessment/evaluation consistent with likely myofascial pain and spasms.  No concerning history, clinical evidence/work-up, or exam findings for the considered differentials of ACS/MI, PTX, shingles, PE.  These conditions have been thoroughly evaluated and determined to be sufficiently unlikely to be the etiology of patient's presenting symptoms.    Scores Heart 0 (1% MACE)    Prescribed none.  After receiving an appropriate exam, clinical work-up, and necessary  interventions/treatment, Patient is appropriate for discharge at this time due to no concerning symptoms or findings requiring hospitalization for stabilization or further interrogation/management and is appropriate for management of symptoms at home with recommended appropriate outpatient follow-up.  Patient was encouraged to ask any questions or for clarification of today's ED encounter.  Patient is agreeable to plan of care. Discussed with Patient today's results/findings and likely diagnosis, provided appropriate RTED precautions along with recommended follow-up with PCP.      Per Chart Review:  Fine  visit yesterday for urinary symptoms, notes 1 day of urinary pressure and urgency/frequency with some dysuria, VS notable for mildly hypertensive otherwise remaining VSS, exam unremarkable/noncontributory, treated for urinary tract infection with Macrobid pending urine culture.      Parts of this chart have been completed using voice-to-tect recognition software. Please excuse any errors of transcription that were missed for editing/correcting.    Amount and/or Complexity of Data Reviewed  Labs: ordered. Decision-making details documented in ED Course.  Radiology: ordered and independent interpretation performed. Decision-making details documented in ED Course.  ECG/medicine tests: ordered and independent interpretation performed. Decision-making details documented in ED Course.        Procedure  Procedures     Anand Gottlieb MD  02/21/25 1971

## 2025-02-20 NOTE — ED TRIAGE NOTES
Patient reports intermittent CP since midnight.  No cardiac history, minimal sob and nausea with the pain.

## 2025-02-20 NOTE — DISCHARGE INSTRUCTIONS
Thank you for allowing myself and the team to take care of you during your emergency situation and addressing your health concerns.    You were evaluated for chest pain.     Your likely condition/diagnosis: Chest wall muscle pain    During your visit in the emergency department you were evaluated for your presenting symptoms.  Based on the extensive workup you received, your major adverse cardiac event (MACE)/heart attack is 1% or less, which means there is no complete diagnostic/workup taken completely eliminate an event from occurring.  This means there is a chance of an event involving your heart that cannot be completely excluded that can occur in 1 out of 100 people.  All efforts were made to identify the source of your symptoms and identify any life-threatening conditions.  These life-threatening conditions that were attempted to be identified and medically managed/treated include but not limited to a heart attack, blood clot in the lungs, inflammation/fluid around your heart, air between your lungs and chest wall (pneumothorax), lung infection/pneumonia, and air/fluid (infection) within the middle part of your chest.  Additionally, you may be experiencing symptoms that are non-life-threatening but are uncomfortable and disruptive to your everyday life.  All efforts were made to manage your symptoms while in the emergency department along with appropriate at home therapy for symptomatic management during your recovery. Please be aware that not all of the conditions explained/discussed in these discharge instructions are applicable to your condition but encompass many of the conditions that were evaluated for during your ED encounter.      During your evaluation and assessment, all measures were taken to evaluate you and address your health concerns to identify dangerous and life threatening health conditions. It is not possible to identify all health conditions or pathologies and eliminate any chance of  unfavorable outcomes while in the Emergency Department. My team encourages you to be vigilant with your health and follow-up with the appropriate providers outlined in your discharge paperwork. Please return to the Emergency Department if you feel that your health has not improved or experiencing worsening symptoms.    Special instructions please make a follow-up point with your primary care physician to further manage symptoms and address your concerns..    Incidental findings:  None

## 2025-02-21 LAB — BACTERIA UR CULT: NORMAL

## 2025-04-23 ENCOUNTER — OFFICE VISIT (OUTPATIENT)
Dept: URGENT CARE | Age: 45
End: 2025-04-23
Payer: COMMERCIAL

## 2025-04-23 ENCOUNTER — ANCILLARY PROCEDURE (OUTPATIENT)
Dept: URGENT CARE | Age: 45
End: 2025-04-23
Payer: COMMERCIAL

## 2025-04-23 VITALS
HEART RATE: 98 BPM | DIASTOLIC BLOOD PRESSURE: 91 MMHG | HEIGHT: 64 IN | OXYGEN SATURATION: 98 % | SYSTOLIC BLOOD PRESSURE: 141 MMHG | RESPIRATION RATE: 18 BRPM | BODY MASS INDEX: 37.56 KG/M2 | WEIGHT: 220 LBS | TEMPERATURE: 98.5 F

## 2025-04-23 DIAGNOSIS — R05.1 ACUTE COUGH: ICD-10-CM

## 2025-04-23 DIAGNOSIS — B34.8 RHINOVIRUS: Primary | ICD-10-CM

## 2025-04-23 LAB
POC CORONAVIRUS SARS-COV-2 PCR: NEGATIVE
POC HUMAN RHINOVIRUS PCR: POSITIVE
POC INFLUENZA A VIRUS PCR: NEGATIVE
POC INFLUENZA B VIRUS PCR: NEGATIVE
POC RESPIRATORY SYNCYTIAL VIRUS PCR: NEGATIVE

## 2025-04-23 PROCEDURE — 3008F BODY MASS INDEX DOCD: CPT | Performed by: PHYSICIAN ASSISTANT

## 2025-04-23 PROCEDURE — 99213 OFFICE O/P EST LOW 20 MIN: CPT | Performed by: PHYSICIAN ASSISTANT

## 2025-04-23 PROCEDURE — 71046 X-RAY EXAM CHEST 2 VIEWS: CPT | Performed by: PHYSICIAN ASSISTANT

## 2025-04-23 PROCEDURE — 87631 RESP VIRUS 3-5 TARGETS: CPT | Performed by: PHYSICIAN ASSISTANT

## 2025-04-23 RX ORDER — AZITHROMYCIN 250 MG/1
TABLET, FILM COATED ORAL
Qty: 6 TABLET | Refills: 0 | Status: SHIPPED | OUTPATIENT
Start: 2025-04-23

## 2025-04-23 RX ORDER — BROMPHENIRAMINE MALEATE, PSEUDOEPHEDRINE HYDROCHLORIDE, AND DEXTROMETHORPHAN HYDROBROMIDE 2; 30; 10 MG/5ML; MG/5ML; MG/5ML
10 SYRUP ORAL 4 TIMES DAILY PRN
Qty: 250 ML | Refills: 0 | Status: SHIPPED | OUTPATIENT
Start: 2025-04-23 | End: 2025-05-03

## 2025-04-23 ASSESSMENT — ENCOUNTER SYMPTOMS
SINUS COMPLAINT: 1
COUGH: 1

## 2025-04-23 NOTE — PROGRESS NOTES
"Subjective   Patient ID: Maciej Noyola is a 44 y.o. female. They present today with a chief complaint of Cough (Congestion / ) and Sinus Problem (Over a week ).    History of Present Illness   Patient is a pleasant 44-year-old white female, no significant past medical history, presenting to the clinic for chief complaint of upper respiratory congestion and cough.  Patient is reporting approximately 5-day history of upper respiratory congestion rhinorrhea postnasal drainage and dry cough.  She does note she is taking care of her dad who has pneumonia and is concerned for pneumonia.  She denies any fever or chills.  No sputum production.  No chest pain or shortness of breath.  No abdominal pain, nausea, vomiting.  No numbness tingling or focal weakness.      Cough    Sinus Problem  Associated symptoms: cough        Past Medical History  Allergies as of 04/23/2025    (No Known Allergies)       Prescriptions Prior to Admission[1]       Medical History[2]    Surgical History[3]     reports that she has never smoked. She has been exposed to tobacco smoke. She has never used smokeless tobacco. She reports that she does not drink alcohol and does not use drugs.    Review of Systems  Review of Systems   Respiratory:  Positive for cough.                                   Objective    Vitals:    04/23/25 1050   BP: (!) 141/91   Pulse: 98   Resp: 18   Temp: 36.9 °C (98.5 °F)   TempSrc: Oral   SpO2: 98%   Weight: 99.8 kg (220 lb)   Height: 1.626 m (5' 4\")     No LMP recorded. Patient is perimenopausal.    Physical Exam  General: Vitals Noted. No distress. Normocephalic.     HEENT: TMs normal, EOMI, normal conjunctiva, patent nares with erythematous edematous and appear nasal turbinates and clear rhinorrhea bilaterally.  Posterior oropharynx with signs of postnasal drainage without any erythema swelling or tonsillar exudate.  Uvula is in the midline and nonedematous.  No drooling.  No trismus.    Neck: Supple with no " adenopathy.     Cardiac: Regular Rate and Rhythm. No murmur.     Pulmonary: Equal breath sounds bilaterally. No wheezes, rhonchi, or rales.    Abdomen: Soft, non-tender, with normal bowel sounds.     Musculoskeletal: Moves all extremities, no effusion, no edema.     Skin: No obvious rashes.  Procedures    Point of Care Test & Imaging Results from this visit  Results for orders placed or performed in visit on 04/23/25   POCT SPOTFIRE R/ST Panel Mini w/COVID ([facility]) manually resulted    Collection Time: 04/23/25 11:19 AM    Specimen: Swab   Result Value Ref Range    POC Sars-Cov-2 PCR Negative Negative    POC Respiratory Syncytial Virus PCR Negative Negative    POC Influenza A Virus PCR Negative Negative    POC Influenza B Virus PCR Negative Negative    POC Human Rhinovirus PCR Positive (A) Negative      Imaging  XR chest 2 views  Result Date: 4/23/2025  1.  No evidence of acute cardiopulmonary process.       MACRO: None   Signed by: Andi Brock 4/23/2025 11:33 AM Dictation workstation:   [id number]      Cardiology, Vascular, and Other Imaging  No other imaging results found for the past 2 days      Diagnostic study results (if any) were reviewed by Akhil Nunez PA-C.    Assessment/Plan   Allergies, medications, history, and pertinent labs/EKGs/Imaging reviewed by Akhil Nunez PA-C.     Medical Decision Making  Patient was seen eval in the clinic with complaint of upper respiratory congestion.  On exam patient is nontoxic very well-appearing despite comfortably no acute distress.  Vital signs are stable, afebrile.  Chest is clear, heart is regular, belly is initially soft and nontender.  ENT examination as above consistent with a viral infection.  Spot viral respiratory panel was performed and did return positive for rhinovirus.  2 view chest x-ray performed which reveals no evidence of acute cardiopulmonary abnormalities.  Will advise supportive care measures for the patient including plenty  of fluids, rest, Tylenol or Profen as needed provided prescription Bromfed-DM for his as needed for cough and congestion.  She is requesting an antibiotic and I did provide a Z-Angel however advise she watch her weight and monitor her symptoms and if not improving in the next week she can initiate the antibiotic as planned.  Advised to up with the plan of care physician as well.  I reviewed my impression, plan, strict return report to ED precautions with the patient.  She expresses understanding and agreement plan of care.    Orders and Diagnoses  Diagnoses and all orders for this visit:  Rhinovirus  -     azithromycin (Zithromax) 250 mg tablet; Take 2 tablets for one day then 1 tablet per day for 4 days  -     brompheniramine-pseudoeph-DM 2-30-10 mg/5 mL syrup; Take 10 mL by mouth 4 times a day as needed for congestion or cough for up to 10 days.  Acute cough  -     XR chest 2 views; Future  -     POCT SPOTFIRE R/ST Panel Mini w/COVID ([facility]) manually resulted        Medical Admin Record      Follow Up Instructions  No follow-ups on file.    Patient disposition: Home    Electronically signed by Akhil Nunez PA-C  11:44 AM         [1] (Not in a hospital admission)   [2]   Past Medical History:  Diagnosis Date    Lymphoma    [3]   Past Surgical History:  Procedure Laterality Date    BI STEREOTACTIC GUIDED BREAST RIGHT LOCALIZATION AND BIOPSY Right 12/17/2019    BI STEREOTACTIC GUIDED BREAST LOCALIZATION AND BIOPSY RIGHT [facility]    BI US GUIDED BREAST LOCALIZATION AND BIOPSY RIGHT Right 05/29/2020    BI US GUIDED BREAST LOCALIZATION AND BIOPSY RIGHT [facility]    BREAST BIOPSY

## 2025-04-25 ENCOUNTER — APPOINTMENT (OUTPATIENT)
Dept: PRIMARY CARE | Facility: CLINIC | Age: 45
End: 2025-04-25
Payer: COMMERCIAL

## 2025-04-25 VITALS
WEIGHT: 216 LBS | DIASTOLIC BLOOD PRESSURE: 86 MMHG | OXYGEN SATURATION: 98 % | HEART RATE: 82 BPM | SYSTOLIC BLOOD PRESSURE: 120 MMHG | HEIGHT: 64 IN | BODY MASS INDEX: 36.88 KG/M2 | TEMPERATURE: 98.1 F

## 2025-04-25 DIAGNOSIS — L30.9 DERMATITIS: Primary | ICD-10-CM

## 2025-04-25 PROCEDURE — 3008F BODY MASS INDEX DOCD: CPT | Performed by: FAMILY MEDICINE

## 2025-04-25 PROCEDURE — 99213 OFFICE O/P EST LOW 20 MIN: CPT | Performed by: FAMILY MEDICINE

## 2025-04-25 RX ORDER — CEPHALEXIN 500 MG/1
500 CAPSULE ORAL 3 TIMES DAILY
Qty: 21 CAPSULE | Refills: 0 | Status: SHIPPED | OUTPATIENT
Start: 2025-04-25 | End: 2025-05-02

## 2025-04-25 RX ORDER — HYDROXYZINE HYDROCHLORIDE 25 MG/1
25 TABLET, FILM COATED ORAL EVERY 6 HOURS PRN
Qty: 30 TABLET | Refills: 1 | Status: SHIPPED | OUTPATIENT
Start: 2025-04-25 | End: 2025-05-25

## 2025-04-25 RX ORDER — CLOTRIMAZOLE AND BETAMETHASONE DIPROPIONATE 10; .64 MG/G; MG/G
1 CREAM TOPICAL 2 TIMES DAILY
Qty: 15 G | Refills: 0 | Status: SHIPPED | OUTPATIENT
Start: 2025-04-25

## 2025-04-25 ASSESSMENT — ENCOUNTER SYMPTOMS
ENDOCRINE NEGATIVE: 1
FEVER: 0
NAUSEA: 0
SHORTNESS OF BREATH: 0
DIFFICULTY URINATING: 0
DIARRHEA: 0
DIZZINESS: 0

## 2025-04-25 ASSESSMENT — PAIN SCALES - GENERAL: PAINLEVEL_OUTOF10: 0-NO PAIN

## 2025-04-25 NOTE — PROGRESS NOTES
"Subjective   Patient ID: Maciej Noyola is a 44 y.o. female who presents for Rash (Left calf-circular started off the size of a pea but now has gotten larger, noticed 1 month ago./Used ketoconazole cream- had a reaction to the band aid.).    HPI  The pt presents to the clinic with concerns of a rash. Past medical hx of obesity, B-cell lymphoma, iron-deficiency anemia, anxiety, ADD, major depressive disorder, sciatica, and ANNI.    -Rash: Pt noticed a rash on her left lower leg posterior region approximately 1 month ago. The rash was the size of a pea initially but has been growing larger over time. Pt recalls that she used ketoconazole cream and a Band-Aid on this rash but suffered a reaction (inflammation/erythma present on rash). As such, pt received prescriptions for Atarax medication, Lotrisone cream, and Keflex medication to treat this condition.    Rash  Pertinent negatives include no diarrhea, fever or shortness of breath.      Review of Systems   Constitutional:  Negative for fever.        Also see HPI   Eyes:  Negative for visual disturbance.   Respiratory:  Negative for shortness of breath.    Cardiovascular:  Negative for chest pain.   Gastrointestinal:  Negative for diarrhea and nausea.   Endocrine: Negative.    Genitourinary:  Negative for difficulty urinating.   Skin:  Positive for rash.   Neurological:  Negative for dizziness.        No focal deficits   Psychiatric/Behavioral:  Negative for suicidal ideas.    All other systems reviewed and are negative.      Objective   /86   Pulse 82   Temp 36.7 °C (98.1 °F)   Ht 1.626 m (5' 4\")   Wt 98 kg (216 lb)   SpO2 98%   BMI 37.08 kg/m²     Physical Exam  Vitals and nursing note reviewed.   Constitutional:       Appearance: Normal appearance.   HENT:      Head: Normocephalic and atraumatic.   Eyes:      Conjunctiva/sclera: Conjunctivae normal.   Cardiovascular:      Rate and Rhythm: Normal rate and regular rhythm.      Heart sounds: Normal heart " sounds.   Pulmonary:      Effort: Pulmonary effort is normal.      Breath sounds: Normal breath sounds.   Skin:     Findings: Rash present.      Comments: Rash on left lower leg posterior region.   Neurological:      Mental Status: She is oriented to person, place, and time.   Psychiatric:         Mood and Affect: Mood normal.         Behavior: Behavior normal.         Assessment/Plan   Diagnoses and all orders for this visit:  Dermatitis  -     cephalexin (Keflex) 500 mg capsule; Take 1 capsule (500 mg) by mouth 3 times a day for 7 days.  -     hydrOXYzine HCL (Atarax) 25 mg tablet; Take 1 tablet (25 mg) by mouth every 6 hours if needed for itching or allergies (rash).  -     clotrimazole-betamethasone (Lotrisone) cream; Apply 1 Application topically 2 times a day.         Scribe Attestation  By signing my name below, IYnes , Scrluis   attest that this documentation has been prepared under the direction and in the presence of Norberto Walker DO.

## 2025-06-04 ENCOUNTER — OFFICE VISIT (OUTPATIENT)
Dept: URGENT CARE | Age: 45
End: 2025-06-04
Payer: COMMERCIAL

## 2025-06-04 VITALS
RESPIRATION RATE: 18 BRPM | DIASTOLIC BLOOD PRESSURE: 85 MMHG | HEART RATE: 87 BPM | BODY MASS INDEX: 36.88 KG/M2 | OXYGEN SATURATION: 100 % | WEIGHT: 216 LBS | TEMPERATURE: 98.5 F | SYSTOLIC BLOOD PRESSURE: 149 MMHG | HEIGHT: 64 IN

## 2025-06-04 DIAGNOSIS — S29.011A PECTORALIS MUSCLE STRAIN, INITIAL ENCOUNTER: Primary | ICD-10-CM

## 2025-06-04 RX ORDER — PREDNISONE 20 MG/1
TABLET ORAL
Qty: 10 TABLET | Refills: 0 | Status: SHIPPED | OUTPATIENT
Start: 2025-06-04

## 2025-06-04 RX ORDER — BACLOFEN 10 MG/1
10 TABLET ORAL 3 TIMES DAILY
Qty: 15 TABLET | Refills: 0 | Status: SHIPPED | OUTPATIENT
Start: 2025-06-04 | End: 2025-06-09

## 2025-06-04 RX ORDER — KETOROLAC TROMETHAMINE 30 MG/ML
30 INJECTION, SOLUTION INTRAMUSCULAR; INTRAVENOUS ONCE
Status: COMPLETED | OUTPATIENT
Start: 2025-06-04 | End: 2025-06-04

## 2025-06-04 RX ADMIN — KETOROLAC TROMETHAMINE 30 MG: 30 INJECTION, SOLUTION INTRAMUSCULAR; INTRAVENOUS at 18:39

## 2025-06-06 NOTE — PROGRESS NOTES
"Subjective   Patient ID: Maciej Noyola is a 44 y.o. female. They present today with a chief complaint of Injury (Pt c/o x 2 weeks, left side ache/pain in armpit area -- no injury.  Today, received a hug, felt a pop more anterior to armpit pain, now painful to move left arm).    History of Present Illness  Patient is a pleasant 44-year-old white female, no significant past medical history, presenting to the clinic for chief complaint of left-sided chest wall pain.  Patient states she was hugged by another person earlier today and she felt a sudden sharp pain just anterior to her left axilla on her left upper chest wall.  She is reporting a clinical concern for pain over this area with increased pain with range of motion of her left shoulder.  She denies any chest tightness or shortness of breath.  Denies any pleuritic chest pain.  No abdominal pain, nausea, vomiting.  Has not tried anything at home for her symptoms.  Denies any radiation of the pain.  No further complaints.      Injury      Past Medical History  Allergies as of 06/04/2025    (No Known Allergies)       Prescriptions Prior to Admission[1]       Medical History[2]    Surgical History[3]     reports that she has never smoked. She has been exposed to tobacco smoke. She has never used smokeless tobacco. She reports that she does not drink alcohol and does not use drugs.    Review of Systems  Review of Systems                               Objective    Vitals:    06/04/25 1752   BP: 149/85   BP Location: Right arm   Patient Position: Sitting   BP Cuff Size: Adult   Pulse: 87   Resp: 18   Temp: 36.9 °C (98.5 °F)   TempSrc: Oral   SpO2: 100%   Weight: 98 kg (216 lb)   Height: 1.626 m (5' 4\")     No LMP recorded. Patient is perimenopausal.    Physical Exam  General: Vitals Noted. No distress. Normocephalic.     HEENT: TMs normal, EOMI, normal conjunctiva, patent nares, Normal OP    Neck: Supple with no adenopathy.     Cardiac: Regular Rate and Rhythm. No " murmur.     Pulmonary: Equal breath sounds bilaterally. No wheezes, rhonchi, or rales.    Abdomen: Soft, non-tender, with normal bowel sounds.     Musculoskeletal: There is some mild tenderness to palpation over the left anterior superior chest wall just anterior to the axilla where the pectoralis major muscle inserts.  There is no associated soft tissue swelling or overlying skin changes.  Is neurovascular intact distally.  Moves all extremities, no effusion, no edema.     Skin: No obvious rashes.  Procedures    Point of Care Test & Imaging Results from this visit    Imaging  No results found.    Cardiology, Vascular, and Other Imaging  No other imaging results found for the past 2 days      Diagnostic study results (if any) were reviewed by Akhil Nunez PA-C.    Assessment/Plan   Allergies, medications, history, and pertinent labs/EKGs/Imaging reviewed by Akhil Nunez PA-C.     Medical Decision Making  Patient was seen eval in the clinic for chief complaint of chest wall pain.  On exam patient is nontoxic well-appearing respite comfortably in no acute distress.  Vital signs are stable, afebrile.  Chest clear, heart is regular, belly soft nontender.  Evaluation of chest wall as above concerning for an underlying muscle strain.  She is provided with 30 mg Toradol IM in the clinic will be discharged home with a muscle relaxer and 5-day course of prednisone.  Advise she ice.  Advise she continue Tylenol or Profen at home as needed.  Discharged home at this time.  Reviewed my impression, plan, strict return wrist report ED precautions with the patient.  She expresses understanding and agreement with plan of care.      Orders and Diagnoses  Diagnoses and all orders for this visit:  Pectoralis muscle strain, initial encounter  -     ketorolac (Toradol) injection 30 mg  -     predniSONE (Deltasone) 20 mg tablet; Take two tablets per day for 5 days  -     baclofen (Lioresal) 10 mg tablet; Take 1 tablet (10  mg) by mouth 3 times a day for 5 days.        Medical Admin Record  Administrations This Visit       ketorolac (Toradol) injection 30 mg       Admin Date  06/04/2025 Action  Given Dose  30 mg Route  intramuscular Documented By  Liban Vasquez MA                    Follow Up Instructions  No follow-ups on file.    Patient disposition: Home    Electronically signed by Akhil Nunez PA-C  7:59 AM         [1] (Not in a hospital admission)  [2]   Past Medical History:  Diagnosis Date    Lymphoma    [3]   Past Surgical History:  Procedure Laterality Date    BI STEREOTACTIC GUIDED BREAST RIGHT LOCALIZATION AND BIOPSY Right 12/17/2019    BI STEREOTACTIC GUIDED BREAST LOCALIZATION AND BIOPSY RIGHT [facility]    [facility] US GUIDED BREAST LOCALIZATION AND BIOPSY RIGHT Right 05/29/2020    BI US GUIDED BREAST LOCALIZATION AND BIOPSY RIGHT [facility]    BREAST BIOPSY

## 2025-08-15 ENCOUNTER — APPOINTMENT (OUTPATIENT)
Dept: HEMATOLOGY/ONCOLOGY | Facility: CLINIC | Age: 45
End: 2025-08-15
Payer: COMMERCIAL

## 2025-08-25 ENCOUNTER — LAB (OUTPATIENT)
Dept: LAB | Facility: CLINIC | Age: 45
End: 2025-08-25
Payer: COMMERCIAL

## 2025-08-25 DIAGNOSIS — C85.10 LOW GRADE B-CELL LYMPHOMA (MULTI): ICD-10-CM

## 2025-08-25 DIAGNOSIS — L29.9 ITCHING: ICD-10-CM

## 2025-08-25 LAB
25(OH)D3 SERPL-MCNC: 102 NG/ML (ref 30–100)
ALBUMIN SERPL BCP-MCNC: 4.2 G/DL (ref 3.4–5)
ALP SERPL-CCNC: 100 U/L (ref 33–110)
ALT SERPL W P-5'-P-CCNC: 14 U/L (ref 7–45)
ANION GAP SERPL CALC-SCNC: 12 MMOL/L (ref 10–20)
AST SERPL W P-5'-P-CCNC: 14 U/L (ref 9–39)
BASOPHILS # BLD AUTO: 0.12 X10*3/UL (ref 0–0.1)
BASOPHILS NFR BLD AUTO: 1.7 %
BILIRUB SERPL-MCNC: 0.3 MG/DL (ref 0–1.2)
BUN SERPL-MCNC: 9 MG/DL (ref 6–23)
CALCIUM SERPL-MCNC: 9 MG/DL (ref 8.6–10.3)
CHLORIDE SERPL-SCNC: 107 MMOL/L (ref 98–107)
CO2 SERPL-SCNC: 25 MMOL/L (ref 21–32)
CREAT SERPL-MCNC: 0.79 MG/DL (ref 0.5–1.05)
EGFRCR SERPLBLD CKD-EPI 2021: >90 ML/MIN/1.73M*2
EOSINOPHIL # BLD AUTO: 0.15 X10*3/UL (ref 0–0.7)
EOSINOPHIL NFR BLD AUTO: 2.2 %
ERYTHROCYTE [DISTWIDTH] IN BLOOD BY AUTOMATED COUNT: 13.3 % (ref 11.5–14.5)
FERRITIN SERPL-MCNC: 150 NG/ML (ref 8–150)
GLUCOSE SERPL-MCNC: 89 MG/DL (ref 74–99)
HCT VFR BLD AUTO: 42.9 % (ref 36–46)
HGB BLD-MCNC: 14.1 G/DL (ref 12–16)
IMM GRANULOCYTES # BLD AUTO: 0.02 X10*3/UL (ref 0–0.7)
IMM GRANULOCYTES NFR BLD AUTO: 0.3 % (ref 0–0.9)
IRON SATN MFR SERPL: 12 % (ref 25–45)
IRON SERPL-MCNC: 38 UG/DL (ref 35–150)
LYMPHOCYTES # BLD AUTO: 1.42 X10*3/UL (ref 1.2–4.8)
LYMPHOCYTES NFR BLD AUTO: 20.7 %
MCH RBC QN AUTO: 28.4 PG (ref 26–34)
MCHC RBC AUTO-ENTMCNC: 32.9 G/DL (ref 32–36)
MCV RBC AUTO: 87 FL (ref 80–100)
MONOCYTES # BLD AUTO: 0.72 X10*3/UL (ref 0.1–1)
MONOCYTES NFR BLD AUTO: 10.5 %
NEUTROPHILS # BLD AUTO: 4.44 X10*3/UL (ref 1.2–7.7)
NEUTROPHILS NFR BLD AUTO: 64.6 %
NRBC BLD-RTO: 0 /100 WBCS (ref 0–0)
PLATELET # BLD AUTO: 314 X10*3/UL (ref 150–450)
POTASSIUM SERPL-SCNC: 4.1 MMOL/L (ref 3.5–5.3)
PROT SERPL-MCNC: 7.1 G/DL (ref 6.4–8.2)
RBC # BLD AUTO: 4.96 X10*6/UL (ref 4–5.2)
SODIUM SERPL-SCNC: 140 MMOL/L (ref 136–145)
TIBC SERPL-MCNC: 318 UG/DL (ref 240–445)
UIBC SERPL-MCNC: 280 UG/DL (ref 110–370)
VIT B12 SERPL-MCNC: 443 PG/ML (ref 211–911)
WBC # BLD AUTO: 6.9 X10*3/UL (ref 4.4–11.3)

## 2025-08-25 PROCEDURE — 82607 VITAMIN B-12: CPT

## 2025-08-25 PROCEDURE — 82728 ASSAY OF FERRITIN: CPT | Performed by: INTERNAL MEDICINE

## 2025-08-25 PROCEDURE — 80053 COMPREHEN METABOLIC PANEL: CPT

## 2025-08-25 PROCEDURE — 85025 COMPLETE CBC W/AUTO DIFF WBC: CPT

## 2025-08-25 PROCEDURE — 36415 COLL VENOUS BLD VENIPUNCTURE: CPT

## 2025-08-25 PROCEDURE — 83540 ASSAY OF IRON: CPT

## 2025-08-25 PROCEDURE — 82306 VITAMIN D 25 HYDROXY: CPT

## 2025-08-29 ENCOUNTER — OFFICE VISIT (OUTPATIENT)
Dept: HEMATOLOGY/ONCOLOGY | Facility: CLINIC | Age: 45
End: 2025-08-29
Payer: COMMERCIAL

## 2025-08-29 VITALS
DIASTOLIC BLOOD PRESSURE: 88 MMHG | TEMPERATURE: 95.4 F | HEART RATE: 82 BPM | BODY MASS INDEX: 38.63 KG/M2 | RESPIRATION RATE: 18 BRPM | WEIGHT: 218.03 LBS | SYSTOLIC BLOOD PRESSURE: 131 MMHG | HEIGHT: 63 IN | OXYGEN SATURATION: 99 %

## 2025-08-29 DIAGNOSIS — L29.9 ITCHING: ICD-10-CM

## 2025-08-29 DIAGNOSIS — C85.10 LOW GRADE B-CELL LYMPHOMA (MULTI): ICD-10-CM

## 2025-08-29 DIAGNOSIS — D50.0 IRON DEFICIENCY ANEMIA DUE TO CHRONIC BLOOD LOSS: Primary | ICD-10-CM

## 2025-08-29 LAB
25(OH)D3 SERPL-MCNC: 105 NG/ML (ref 30–100)
ALBUMIN SERPL BCP-MCNC: 4.1 G/DL (ref 3.4–5)
ALP SERPL-CCNC: 103 U/L (ref 33–110)
ALT SERPL W P-5'-P-CCNC: 14 U/L (ref 7–45)
ANION GAP SERPL CALC-SCNC: 12 MMOL/L (ref 10–20)
AST SERPL W P-5'-P-CCNC: 12 U/L (ref 9–39)
BASOPHILS # BLD AUTO: 0.14 X10*3/UL (ref 0–0.1)
BASOPHILS NFR BLD AUTO: 1.8 %
BILIRUB SERPL-MCNC: 0.4 MG/DL (ref 0–1.2)
BUN SERPL-MCNC: 15 MG/DL (ref 6–23)
CALCIUM SERPL-MCNC: 10.2 MG/DL (ref 8.6–10.3)
CHLORIDE SERPL-SCNC: 105 MMOL/L (ref 98–107)
CO2 SERPL-SCNC: 23 MMOL/L (ref 21–32)
CREAT SERPL-MCNC: 0.7 MG/DL (ref 0.5–1.05)
EGFRCR SERPLBLD CKD-EPI 2021: >90 ML/MIN/1.73M*2
EOSINOPHIL # BLD AUTO: 0.12 X10*3/UL (ref 0–0.7)
EOSINOPHIL NFR BLD AUTO: 1.5 %
ERYTHROCYTE [DISTWIDTH] IN BLOOD BY AUTOMATED COUNT: 13.1 % (ref 11.5–14.5)
FERRITIN SERPL-MCNC: 171 NG/ML (ref 8–150)
FOLATE SERPL-MCNC: >24 NG/ML
GLUCOSE SERPL-MCNC: 95 MG/DL (ref 74–99)
HCT VFR BLD AUTO: 41.8 % (ref 36–46)
HGB BLD-MCNC: 13.6 G/DL (ref 12–16)
IMM GRANULOCYTES # BLD AUTO: 0.02 X10*3/UL (ref 0–0.7)
IMM GRANULOCYTES NFR BLD AUTO: 0.3 % (ref 0–0.9)
IRON SATN MFR SERPL: 13 % (ref 25–45)
IRON SERPL-MCNC: 40 UG/DL (ref 35–150)
LDH SERPL L TO P-CCNC: 131 U/L (ref 84–246)
LYMPHOCYTES # BLD AUTO: 1.3 X10*3/UL (ref 1.2–4.8)
LYMPHOCYTES NFR BLD AUTO: 16.5 %
MCH RBC QN AUTO: 28.5 PG (ref 26–34)
MCHC RBC AUTO-ENTMCNC: 32.5 G/DL (ref 32–36)
MCV RBC AUTO: 87 FL (ref 80–100)
MONOCYTES # BLD AUTO: 0.76 X10*3/UL (ref 0.1–1)
MONOCYTES NFR BLD AUTO: 9.7 %
NEUTROPHILS # BLD AUTO: 5.53 X10*3/UL (ref 1.2–7.7)
NEUTROPHILS NFR BLD AUTO: 70.2 %
NRBC BLD-RTO: 0 /100 WBCS (ref 0–0)
PLATELET # BLD AUTO: 328 X10*3/UL (ref 150–450)
POTASSIUM SERPL-SCNC: 4.1 MMOL/L (ref 3.5–5.3)
PROT SERPL-MCNC: 7.2 G/DL (ref 6.4–8.2)
RBC # BLD AUTO: 4.78 X10*6/UL (ref 4–5.2)
SODIUM SERPL-SCNC: 136 MMOL/L (ref 136–145)
TIBC SERPL-MCNC: 316 UG/DL (ref 240–445)
UIBC SERPL-MCNC: 276 UG/DL (ref 110–370)
VIT B12 SERPL-MCNC: 498 PG/ML (ref 211–911)
WBC # BLD AUTO: 7.9 X10*3/UL (ref 4.4–11.3)

## 2025-08-29 PROCEDURE — 82728 ASSAY OF FERRITIN: CPT | Performed by: INTERNAL MEDICINE

## 2025-08-29 PROCEDURE — 83615 LACTATE (LD) (LDH) ENZYME: CPT | Performed by: INTERNAL MEDICINE

## 2025-08-29 PROCEDURE — 83540 ASSAY OF IRON: CPT | Performed by: INTERNAL MEDICINE

## 2025-08-29 PROCEDURE — 99215 OFFICE O/P EST HI 40 MIN: CPT | Performed by: INTERNAL MEDICINE

## 2025-08-29 PROCEDURE — 80053 COMPREHEN METABOLIC PANEL: CPT | Performed by: INTERNAL MEDICINE

## 2025-08-29 PROCEDURE — 82746 ASSAY OF FOLIC ACID SERUM: CPT | Performed by: INTERNAL MEDICINE

## 2025-08-29 PROCEDURE — 82607 VITAMIN B-12: CPT | Performed by: INTERNAL MEDICINE

## 2025-08-29 PROCEDURE — 3008F BODY MASS INDEX DOCD: CPT | Performed by: INTERNAL MEDICINE

## 2025-08-29 PROCEDURE — 85025 COMPLETE CBC W/AUTO DIFF WBC: CPT | Performed by: INTERNAL MEDICINE

## 2025-08-29 PROCEDURE — 82306 VITAMIN D 25 HYDROXY: CPT | Performed by: INTERNAL MEDICINE

## 2025-08-29 RX ORDER — FAMOTIDINE 10 MG/ML
20 INJECTION, SOLUTION INTRAVENOUS ONCE AS NEEDED
OUTPATIENT
Start: 2025-09-12

## 2025-08-29 RX ORDER — EPINEPHRINE 0.3 MG/.3ML
0.3 INJECTION SUBCUTANEOUS EVERY 5 MIN PRN
OUTPATIENT
Start: 2025-09-12

## 2025-08-29 RX ORDER — HEPARIN SODIUM,PORCINE/PF 10 UNIT/ML
50 SYRINGE (ML) INTRAVENOUS AS NEEDED
OUTPATIENT
Start: 2025-08-29

## 2025-08-29 RX ORDER — DIPHENHYDRAMINE HYDROCHLORIDE 50 MG/ML
50 INJECTION, SOLUTION INTRAMUSCULAR; INTRAVENOUS AS NEEDED
OUTPATIENT
Start: 2025-09-12

## 2025-08-29 RX ORDER — ALBUTEROL SULFATE 0.83 MG/ML
3 SOLUTION RESPIRATORY (INHALATION) AS NEEDED
OUTPATIENT
Start: 2025-09-12

## 2025-08-29 RX ORDER — HEPARIN 100 UNIT/ML
500 SYRINGE INTRAVENOUS AS NEEDED
OUTPATIENT
Start: 2025-08-29

## 2025-08-29 ASSESSMENT — PAIN SCALES - GENERAL: PAINLEVEL_OUTOF10: 0-NO PAIN
